# Patient Record
Sex: FEMALE | URBAN - METROPOLITAN AREA
[De-identification: names, ages, dates, MRNs, and addresses within clinical notes are randomized per-mention and may not be internally consistent; named-entity substitution may affect disease eponyms.]

---

## 2018-03-22 ENCOUNTER — OFFICE VISIT (OUTPATIENT)
Dept: HEMATOLOGY | Age: 68
End: 2018-03-22

## 2018-03-22 VITALS
TEMPERATURE: 97.9 F | DIASTOLIC BLOOD PRESSURE: 76 MMHG | OXYGEN SATURATION: 99 % | BODY MASS INDEX: 34.99 KG/M2 | WEIGHT: 178.2 LBS | HEIGHT: 60 IN | SYSTOLIC BLOOD PRESSURE: 142 MMHG | HEART RATE: 86 BPM

## 2018-03-22 DIAGNOSIS — B18.2 CHRONIC HEPATITIS C WITHOUT HEPATIC COMA (HCC): Primary | ICD-10-CM

## 2018-03-22 RX ORDER — CLOPIDOGREL BISULFATE 75 MG/1
TABLET ORAL
COMMUNITY
Start: 2018-02-05

## 2018-03-22 RX ORDER — METFORMIN HYDROCHLORIDE 500 MG/1
TABLET ORAL 2 TIMES DAILY WITH MEALS
COMMUNITY

## 2018-03-22 RX ORDER — ATORVASTATIN CALCIUM 40 MG/1
TABLET, FILM COATED ORAL
COMMUNITY
Start: 2018-02-05

## 2018-03-22 NOTE — MR AVS SNAPSHOT
[facility]00 [facility] [id number] [address] [place] [address] 
[phone] Patient: Jm Horvath MRN: [id number] Visit Information Date & Time Provider Department Dept. Phone Encounter #  
 3/22/2018  1:30 PM Fara PIZANO [facility] [place], [address] [facility] [id number] Upcoming Health Maintenance Date Due Hepatitis C Screening 1950 DTaP/Tdap/Td series (1 - Tdap) 6/23/1972 BREAST CANCER SCRN MAMMOGRAM 6/23/2001 FOBT Q 1 YEAR AGE 50-75 6/23/2001 ZOSTER VACCINE AGE 60> 4/23/2011 GLAUCOMA SCREENING Q2Y 6/23/2016 Bone Densitometry (Dexa) Screening 6/23/2016 Pneumococcal 65+ Low/Medium Risk (1 of 2 - PCV13) 6/23/2016 Influenza Age 5 to Adult 8/1/2017 MEDICARE YEARLY EXAM 3/14/2018 Allergies as of 3/22/2018  Review Complete On: 3/22/2018 By: Franca Cage LPN Not on File Current Immunizations  Never Reviewed No immunizations on file. Not reviewed this visit Vitals BP Pulse Temp Height(growth percentile) 142/76 (BP 1 Location: Left arm, BP Patient Position: Sitting) 86 97.9 °F (36.6 °C) (Tympanic) 5' (1.524 m) Weight(growth percentile) SpO2 BMI Smoking Status 178 lb 3.2 oz (80.8 kg) 99% 34.8 kg/m2 Never Smoker Vitals History BMI and BSA Data Body Mass Index Body Surface Area 34.8 kg/m 2 1.85 m 2 Your Updated Medication List  
  
   
This list is accurate as of 3/22/18  1:43 PM.  Always use your most recent med list.  
  
  
  
  
 metFORMIN 500 mg tablet Commonly known as:  GLUCOPHAGE Take  by mouth two (2) times daily (with meals). Introducing [facility]! [facility] introduces [employer] patient portal. Now you can access parts of your medical record, email your doctor's office, and request medication refills online. 1. In your internet browser, go to [email or web address] 2. Click on the First Time User? Click Here link in the Sign In box. You will see the New Member Sign Up page. 3. Enter your [employer] Access Code exactly as it appears below. You will not need to use this code after youve completed the sign-up process. If you do not sign up before the expiration date, you must request a new code. · [employer] Access Code: [email or web address] Expires: 6/20/2018 12:57 PM 
 
4. Enter the last four digits of your Social Security Number (xxxx) and Date of Birth (mm/dd/yyyy) as indicated and click Submit. You will be taken to the next sign-up page. 5. Create a [employer] ID. This will be your [employer] login ID and cannot be changed, so think of one that is secure and easy to remember. 6. Create a [employer] password. You can change your password at any time. 7. Enter your Password Reset Question and Answer. This can be used at a later time if you forget your password. 8. Enter your e-mail address. You will receive e-mail notification when new information is available in [address] 9. Click Sign Up. You can now view and download portions of your medical record. 10. Click the Download Summary menu link to download a portable copy of your medical information. If you have questions, please visit the Frequently Asked Questions section of the [employer] website. Remember, [employer] is NOT to be used for urgent needs. For medical emergencies, dial 911. Now available from your iPhone and Android! Please provide this summary of care documentation to your next provider. If you have any questions after today's visit, please call [phone].

## 2018-03-22 NOTE — PROGRESS NOTES
Chief Complaint   Patient presents with    New Patient     HCV  treatment     Visit Vitals    /76 (BP 1 Location: Left arm, BP Patient Position: Sitting)    Pulse 86    Temp 97.9 °F (36.6 °C) (Tympanic)    Ht 5' (1.524 m)    Wt 178 lb 3.2 oz (80.8 kg)    SpO2 99%    BMI 34.8 kg/m2     PHQ over the last two weeks 3/22/2018   Little interest or pleasure in doing things Not at all   Feeling down, depressed or hopeless Not at all   Total Score PHQ 2 0     Medication:  Patient states she is taking a blood thinner, but not sure which one or dose. She also takes a statin, but not sure of the name or dose.

## 2018-03-23 PROBLEM — E78.00 HIGH CHOLESTEROL: Status: ACTIVE | Noted: 2018-03-23

## 2018-03-23 PROBLEM — B18.2 CHRONIC HEPATITIS C WITHOUT HEPATIC COMA (HCC): Status: ACTIVE | Noted: 2018-03-23

## 2018-03-23 NOTE — PROGRESS NOTES
70 Nate Au MD, [address], [place], [place]       Clara Russell, MAYDA Payne, [facility]   Valorie Bassett, BRENNAN Rodriguez [facility] [address]    at [address], [address] Saeid Baron  22.    [phone]    FAX: [address] [place] [address], [address]    [phone]    FAX: [phone]     No care team member to display    Magui Del Toro has asked me to see Pam Lopez in consultation regarding chronic HCV and its management. We attempted a few times during the visit to obtain records and labs. No medical records were available for review when the patient was here for the appointment. The patient is a 78 y.o.  female who was noted to have abnormalities in liver chemistries and subsequently tested positive for chronic HCV recently. Risk factors for acquiring HCV are not apparent. Imaging of the liver was either not performed or the results are not available to me. An assessment of liver fibrosis with biopsy or elastography has not been performed. The patient has never received treatment for chronic HCV. The patient has no symptoms which can be attributed to the liver disorder. The patient has not experienced pain in the right side over the liver, yellowing of the eyes or skin or swelling of the lower extremities. The patient has mild limitations in functional activities which can be attributed to other medical problems that are not related to the liver disease. ALLERGIES  Not on File    MEDICATIONS  Current Outpatient Prescriptions   Medication Sig    metFORMIN (GLUCOPHAGE) 500 mg tablet Take  by mouth two (2) times daily (with meals).  atorvastatin (LIPITOR) 40 mg tablet     clopidogrel (PLAVIX) 75 mg tab      No current facility-administered medications for this visit. SYSTEM REVIEW NOT RELATED TO LIVER DISEASE OR REVIEWED ABOVE:  Constitution systems: Negative for fever, chills, weight gain, weight loss. Eyes: Negative for visual changes. ENT: Negative for sore throat, painful swallowing. Respiratory: Negative for cough, hemoptysis, SOB. Cardiology: Negative for chest pain, palpitations. GI:  Negative for constipation or diarrhea. : Negative for urinary frequency, dysuria, hematuria, nocturia. Skin: Negative for rash. Hematology: Negative for easy bruising, blood clots. Musculo-skeletal: Negative for back pain, muscle pain, weakness. Neurologic: Negative for headaches, dizziness, vertigo, memory problems not related to HE. Psychology: Negative for anxiety, depression. FAMILY HISTORY:  There is no family history of liver disease. SOCIAL HISTORY:  The patient is . The patient has 2 children and 6 grandchildren. She informs me one son recently  acutely when heart valves were found to be ruptured.  during surgery. The patient has never used tobacco products. The patient has never consumed significant amounts of alcohol. The patient has not worked outside of the home. PHYSICAL EXAMINATION:  Visit Vitals    /76 (BP 1 Location: Left arm, BP Patient Position: Sitting)    Pulse 86    Temp 97.9 °F (36.6 °C) (Tympanic)    Ht 5' (1.524 m)    Wt 178 lb 3.2 oz (80.8 kg)    SpO2 99%    BMI 34.8 kg/m2     General: No acute distress. Obese. Eyes: Sclera anicteric. ENT: No oral lesions. Nodes: No adenopathy. Skin: No spider angiomata. No jaundice. No palmar erythema. Respiratory: Lungs clear to auscultation. Cardiovascular: Regular heart rate. No murmurs. No JVD. Abdomen: Soft non-tender. Liver size normal to percussion/palpation. Spleen not palpable.  No obvious ascites. Extremities: No edema. No muscle wasting. No gross arthritic changes. Neurologic: Alert and oriented. Cranial nerves grossly intact. No asterixis. LABORATORY STUDIES:  Recent liver function panel, CBC with platelet count and BMP are not available. These studies will be performed. SEROLOGIES:  Not available or performed. Testing was performed today. LIVER HISTOLOGY:  Not available or performed    ENDOSCOPIC PROCEDURES:  Not available or performed    RADIOLOGY:  Not available or performed    OTHER TESTING:  Not available or performed    ASSESSMENT AND PLAN:  Chronic HCV of unclear severity. Will perform laboratory testing to monitor liver function and degree of liver injury. This will be ordered once we receive the previously drawn labs. Do not want to duplicate. Will perform and/or review results of HCV viral load and HCV genotype to define the specific treatment and duration of treatment that will be required. Will perform serologic and virologic studies to assess for other causes of chronic liver disease. Will perform imaging of the liver with ultrasound. The need to perform an assessment of liver fibrosis was discussed with the patient. The FibroScan can assess liver fibrosis and determine if a patient has advanced fibrosis or cirrhosis without the need for liver biopsy. The FibroScan is currently available at [facility]. This will be performed at the next office visit. The patient has not previously been treated for HCV. The patient was directed to continue all current medications at the current dosages. There are no contraindications for the patient to take any medications that are necessary for treatment of other medical issues. The need for vaccination against viral hepatitis A and B will be assessed with serologic and instituted as appropriate. [facility] [address]. screening will be initiated if the patient is found to be cirrhotic.      All of the above issues were discussed with the patient. All questions were answered. The patient expressed a clear understanding of the above. [facility] 87 in 4 weeks for FibroScan, to review all data and determine the treatment plan. Will determine why she is on Plavix and try to obtain more thorough records.      Rik Benedict, ACNP-BC  Liver [place] [facility] 40[address] [facility] [employer] [facility], [address] Saeid Baron  22.  [phone]

## 2018-03-27 ENCOUNTER — TELEPHONE (OUTPATIENT)
Dept: HEMATOLOGY | Age: 68
End: 2018-03-27

## 2018-03-27 NOTE — TELEPHONE ENCOUNTER
Called and spoke with the patient and she is taking the Plavix because she had a stroke 3-4 months ago.  The patient was also advised that lab orders will be mailed to her per Jaxson Gilmore NP

## 2018-03-28 DIAGNOSIS — B18.2 CHRONIC HEPATITIS C WITHOUT HEPATIC COMA (HCC): Primary | ICD-10-CM

## 2018-05-02 DIAGNOSIS — B18.2 CHRONIC HEPATITIS C WITHOUT HEPATIC COMA (HCC): Primary | ICD-10-CM

## 2018-05-02 RX ORDER — VELPATASVIR AND SOFOSBUVIR 100; 400 MG/1; MG/1
1 TABLET, FILM COATED ORAL DAILY
Qty: 28 TAB | Refills: 2 | Status: SHIPPED | OUTPATIENT
Start: 2018-05-02

## 2018-05-02 NOTE — PROGRESS NOTES
This patient is [facility] patient. They have drawn all of the labs but unfortunately are not in our system. I will document here and then scan into the chart. 4/17/2018  WBC: 9.1  HGB: 12.2  PLT: 164    INR: 1.1    CMP:  Na: 142  K: 4.0  CL: 101  CO2: 25  BUN: 21  Cr: 1.01  AST: 60  ALT: 71  ALP: 74  Alb: 4.2  T bili: 3.3    10/2/2017  HCV FibroSure: F1-F2  GT: 2b  Quant: 6,160,000    Hep A negative  Hep B negative      HOLD ATORVASTATIN while on Mavyret. She is GT 2. Rosario Victor NP  10:11 AM

## 2018-05-03 ENCOUNTER — TELEPHONE (OUTPATIENT)
Dept: HEMATOLOGY | Age: 68
End: 2018-05-03

## 2018-05-03 NOTE — TELEPHONE ENCOUNTER
Left voice mail for patient to return call concerning HCV medication. Follow up appointment for FS canceled per Raymundo Sarah NP due to previous Fibrosure blood draw. Patient is to follow up 4 weeks after receiving HCV medication that was ordered this week.

## 2018-05-30 ENCOUNTER — DOCUMENTATION ONLY (OUTPATIENT)
Dept: HEMATOLOGY | Age: 68
End: 2018-05-30

## 2018-05-30 NOTE — PROGRESS NOTES
Called pt back. She wanted to let us know she started taking the medication on 5/16/2018. She is having no side effects and has been approved for PANF. She understands this is to help offset her co-pay. I will have AW call her to set up 4 week follow up. Rosario Olivera NP  10:27 AM

## 2018-06-20 ENCOUNTER — OFFICE VISIT (OUTPATIENT)
Dept: HEMATOLOGY | Age: 68
End: 2018-06-20

## 2018-06-20 VITALS
SYSTOLIC BLOOD PRESSURE: 136 MMHG | HEART RATE: 79 BPM | OXYGEN SATURATION: 98 % | TEMPERATURE: 98.8 F | RESPIRATION RATE: 19 BRPM | WEIGHT: 179 LBS | BODY MASS INDEX: 35.14 KG/M2 | HEIGHT: 60 IN | DIASTOLIC BLOOD PRESSURE: 72 MMHG

## 2018-06-20 DIAGNOSIS — B18.2 CHRONIC HEPATITIS C WITHOUT HEPATIC COMA (HCC): Primary | ICD-10-CM

## 2018-06-20 PROBLEM — I63.9 CVA (CEREBRAL VASCULAR ACCIDENT) (HCC): Status: ACTIVE | Noted: 2018-06-20

## 2018-06-20 NOTE — PROGRESS NOTES
70 Nate Au MD, [address], [place], [place]       BRENNAN Ballard PA-C Johann Boos, ACNP-BC   Valerie Sanchez, BRENNAN Ortiz [facility] [address]    at [address], [address] Saeid Baron  22.    [phone]    FAX: [address] [place] [address]    at [facility], [id number] [place] [address], 6947 [facility],[address]    [address] [place] [address] Street: [phone]     No care team member to display    Problem List  Date Reviewed: 6/20/2018          Codes Class Noted    Chronic hepatitis C without hepatic coma [facility]) ICD-10-CM: B18.2  ICD-9-CM: 070.54  3/23/2018        High cholesterol ICD-10-CM: E78.00  ICD-9-CM: 272.0  3/23/2018            Curtis Linares returns to the [facility] of [facility] for management of chronic HCV. The active problem list, all pertinent past medical history, medications, liver histology, radiologic findings and laboratory findings related to the liver disorder were reviewed with the patient. The patient is a 78 y.o.  female who was noted to have abnormalities in liver chemistries and subsequently tested positive for chronic HCV recently. Risk factors for acquiring HCV are not apparent. Imaging of the liver was either not performed or the results are not available to me. An assessment of liver fibrosis with FibroSure demonstrates F1-F2 fibrosis. The patient is currently on 12 weeks of Epclusa. She is not having any issues or side effects. The patient has no symptoms which can be attributed to the liver disorder. The patient has not experienced pain in the right side over the liver, yellowing of the eyes or skin or swelling of the lower extremities.      The patient has mild limitations in functional activities which can be attributed to other medical problems that are not related to the liver disease. ALLERGIES  No Known Allergies    MEDICATIONS  Current Outpatient Prescriptions   Medication Sig    sofosbuvir-velpatasvir (EPCLUSA) 400-100 mg tab Take 1 Tab by mouth daily. Indications: CHRONIC HEPATITIS C - GENOTYPE 2    metFORMIN (GLUCOPHAGE) 500 mg tablet Take  by mouth two (2) times daily (with meals).  atorvastatin (LIPITOR) 40 mg tablet     clopidogrel (PLAVIX) 75 mg tab      No current facility-administered medications for this visit. SYSTEM REVIEW NOT RELATED TO LIVER DISEASE OR REVIEWED ABOVE:  Constitution systems: Negative for fever, chills, weight gain, weight loss. Eyes: Negative for visual changes. ENT: Negative for sore throat, painful swallowing. Respiratory: Negative for cough, hemoptysis, SOB. Cardiology: Negative for chest pain, palpitations. GI:  Negative for constipation or diarrhea. : Negative for urinary frequency, dysuria, hematuria, nocturia. Skin: Negative for rash. Hematology: Negative for easy bruising, blood clots. Musculo-skeletal: Negative for back pain, muscle pain, weakness. Neurologic: Negative for headaches, dizziness, vertigo, memory problems not related to HE. Psychology: Negative for anxiety, depression. FAMILY HISTORY:  There is no family history of liver disease. SOCIAL HISTORY:  The patient is . The patient has 2 children and 6 grandchildren. She informs me one son recently  acutely when heart valves were found to be ruptured.  during surgery. The patient has never used tobacco products. The patient has never consumed significant amounts of alcohol. The patient has not worked outside of the home.      PHYSICAL EXAMINATION:  Visit Vitals    /72 (BP 1 Location: Right arm, BP Patient Position: Sitting)    Pulse 79    Temp 98.8 °F (37.1 °C) (Tympanic)    Resp 19    Ht 5' (1.524 m)    [place] 179 lb (81.2 kg)    SpO2 98%    BMI 34.96 kg/m2     General: No acute distress. Obese. Eyes: Sclera anicteric. ENT: No oral lesions. Nodes: No adenopathy. Skin: No spider angiomata. No jaundice. No palmar erythema. Respiratory: Lungs clear to auscultation. Cardiovascular: Regular heart rate. No murmurs. No JVD. Abdomen: Soft non-tender. Liver size normal to percussion/palpation. Spleen not palpable. No obvious ascites. Extremities: No edema. No muscle wasting. No gross arthritic changes. Neurologic: Alert and oriented. Cranial nerves grossly intact. No asterixis. LABORATORY STUDIES:  From 4/17/2018  AST/ALT/ALP/T Bili/ALB: 60/71/74/0.3/4.2  WBC/HB/PLT/INR: 9.1/12.2/164  BUN/CREAT: 21/1.01    SEROLOGIES:  Hep A negative. Hep B sAb negative. Hep C: GT 2B, VL 6,160,000    LIVER HISTOLOGY:  10/2017. FibroSure. F1-F2    ENDOSCOPIC PROCEDURES:  Not available or performed    RADIOLOGY:  6/2017. Abdominal ultrasound. Hyperechoic spots in liver.  6/2017. CT abdomen and pelvis. No acute findings. OTHER TESTING:  Not available or performed    ASSESSMENT AND PLAN:  Chronic HCV with mild fibrosis. The patient is currently on 12 weeks of therapy with Epclusa. She is on her 2nd bottle and had no issues getting refills. She is tolerating the medication well and is having no side effects. The patient was directed to continue all current medications at the current dosages. There are no contraindications for the patient to take any medications that are necessary for treatment of other medical issues. I recommend she receive the Twinrix vaccine for both hep A and B. She has no documented immunity. All of the above issues were discussed with the patient. All questions were answered. The patient expressed a clear understanding of the above. Follow up at [facility] in 8 weeks after completion of medication.     Rich Saez, [facility]-BC  [address] [facility] [address], [address] [address]ae[address]  22.  [phone]

## 2018-06-20 NOTE — MR AVS SNAPSHOT
2700 [facility] Juan [id number] [address] [place] [address] 
[phone] Patient: Michelle Parson MRN: [id number] KCY:[id number]/86/[id number] Visit Information Date & Time Provider Department Dept. Phone Encounter #  
 6/20/2018  1:45 PM Mary Martin, 3687 [facility] [id number] Your Appointments 6/20/2018  1:45 PM  
Follow Up with Mary Martin, NP [address] ([facility]) Appt Note: Follow up 200 [facility] [id number] [facility] [address]  
59 [facility] Juan [address] Upcoming Health Maintenance Date Due DTaP/Tdap/Td series (1 - Tdap) 6/23/1972 BREAST CANCER SCRN MAMMOGRAM 6/23/2001 FOBT Q 1 YEAR AGE 50-75 6/23/2001 ZOSTER VACCINE AGE 60> 4/23/2011 GLAUCOMA SCREENING Q2Y 6/23/2016 Bone Densitometry (Dexa) Screening 6/23/2016 Pneumococcal 65+ Low/Medium Risk (1 of 2 - PCV13) 6/23/2016 MEDICARE YEARLY EXAM 3/14/2018 Influenza Age 5 to Adult 8/1/2018 Allergies as of 6/20/2018  Review Complete On: 6/20/2018 By: Kristian Velasco LPN No Known Allergies Current Immunizations  Never Reviewed No immunizations on file. Not reviewed this visit You Were Diagnosed With   
  
 Codes Comments Chronic hepatitis C without hepatic coma (HCC)    -  Primary ICD-10-CM: B18.2 ICD-9-CM: 070.54 Vitals BP Pulse Temp Resp Height(growth percentile) 136/72 (BP 1 Location: Right arm, BP Patient Position: Sitting) 79 98.8 °F (37.1 °C) (Tympanic) 19 5' (1.524 m) Weight(growth percentile) SpO2 BMI OB Status Smoking Status 179 lb (81.2 kg) 98% 34.96 kg/m2 Menopause Never Smoker Vitals History BMI and BSA Data Body Mass Index Body Surface Area 34.96 kg/m 2 1.85 m 2 Preferred Pharmacy Pharmacy Name Phone [facility], [address] [place] Said [phone] Your Updated Medication List  
  
   
This list is accurate as of 6/20/18  1:40 PM.  Always use your most recent med list.  
  
  
  
  
 atorvastatin 40 mg tablet Commonly known as:  LIPITOR  
  
 clopidogrel 75 mg Tab Commonly known as:  PLAVIX  
  
 metFORMIN 500 mg tablet Commonly known as:  GLUCOPHAGE Take  by mouth two (2) times daily (with meals). sofosbuvir-velpatasvir 400-100 mg Tab Commonly known as:  [place] Daub Take 1 Tab by mouth daily. Indications: CHRONIC HEPATITIS C - GENOTYPE 2 We Performed the Following CBC W/O DIFF [17772 CPT(R)] HCV RNA BY JESSY QL,RFLX TO QT [63220 CPT(R)] HEPATIC FUNCTION PANEL [28388 CPT(R)] METABOLIC PANEL, BASIC [28671 CPT(R)] Introducing [facility]! Abhinav Tai introduces [employer] patient portal. Now you can access parts of your medical record, email your doctor's office, and request medication refills online. 1. In your internet browser, go to [email or web address] [employer] 2. Click on the First Time User? Click Here link in the Sign In box. You will see the New Member Sign Up page. 3. Enter your [employer] Access Code exactly as it appears below. You will not need to use this code after youve completed the sign-up process. If you do not sign up before the expiration date, you must request a new code. · [employer] Access Code: [email or web address] Expires: 9/18/2018  1:40 PM 
 
4. Enter the last four digits of your Social Security Number (xxxx) and Date of Birth (mm/dd/yyyy) as indicated and click Submit. You will be taken to the next sign-up page. 5. Create a [employer] ID. This will be your [employer] login ID and cannot be changed, so think of one that is secure and easy to remember. 6. Create a [employer] password. You can change your password at any time. 7. Enter your Password Reset Question and Answer. This can be used at a later time if you forget your password. 8. Enter your e-mail address. You will receive e-mail notification when new information is available in [address] 9. Click Sign Up. You can now view and download portions of your medical record. 10. Click the Download Summary menu link to download a portable copy of your medical information. If you have questions, please visit the Frequently Asked Questions section of the [employer] website. Remember, [employer] is NOT to be used for urgent needs. For medical emergencies, dial 911. Now available from your iPhone and Android! Please provide this summary of care documentation to your next provider. If you have any questions after today's visit, please call [phone].

## 2018-06-21 LAB
ALBUMIN SERPL-MCNC: 4.2 G/DL (ref 3.6–4.8)
ALP SERPL-CCNC: 68 IU/L (ref 39–117)
ALT SERPL-CCNC: 22 IU/L (ref 0–32)
AST SERPL-CCNC: 25 IU/L (ref 0–40)
BILIRUB DIRECT SERPL-MCNC: 0.07 MG/DL (ref 0–0.4)
BILIRUB SERPL-MCNC: <0.2 MG/DL (ref 0–1.2)
BUN SERPL-MCNC: 15 MG/DL (ref 8–27)
BUN/CREAT SERPL: 15 (ref 12–28)
CALCIUM SERPL-MCNC: 9.4 MG/DL (ref 8.7–10.3)
CHLORIDE SERPL-SCNC: 102 MMOL/L (ref 96–106)
CO2 SERPL-SCNC: 21 MMOL/L (ref 20–29)
CREAT SERPL-MCNC: 1.01 MG/DL (ref 0.57–1)
ERYTHROCYTE [DISTWIDTH] IN BLOOD BY AUTOMATED COUNT: 14.3 % (ref 12.3–15.4)
GFR SERPLBLD CREATININE-BSD FMLA CKD-EPI: 58 ML/MIN/1.73
GFR SERPLBLD CREATININE-BSD FMLA CKD-EPI: 67 ML/MIN/1.73
GLUCOSE SERPL-MCNC: 87 MG/DL (ref 65–99)
HCT VFR BLD AUTO: 36.4 % (ref 34–46.6)
HGB BLD-MCNC: 12 G/DL (ref 11.1–15.9)
MCH RBC QN AUTO: 28 PG (ref 26.6–33)
MCHC RBC AUTO-ENTMCNC: 33 G/DL (ref 31.5–35.7)
MCV RBC AUTO: 85 FL (ref 79–97)
PLATELET # BLD AUTO: 188 X10E3/UL (ref 150–379)
POTASSIUM SERPL-SCNC: 4.5 MMOL/L (ref 3.5–5.2)
PROT SERPL-MCNC: 7.8 G/DL (ref 6–8.5)
RBC # BLD AUTO: 4.28 X10E6/UL (ref 3.77–5.28)
SODIUM SERPL-SCNC: 140 MMOL/L (ref 134–144)
WBC # BLD AUTO: 12.2 X10E3/UL (ref 3.4–10.8)

## 2018-06-22 LAB — HCV RNA SERPL QL NAA+PROBE: NEGATIVE

## 2018-06-29 NOTE — PROGRESS NOTES
Tried to call the patient twice. An unintelligible broken up message is heard, then the call is disconnected.

## 2022-03-19 PROBLEM — I63.9 CVA (CEREBRAL VASCULAR ACCIDENT) (HCC): Status: ACTIVE | Noted: 2018-06-20

## 2022-03-19 PROBLEM — B18.2 CHRONIC HEPATITIS C WITHOUT HEPATIC COMA (HCC): Status: ACTIVE | Noted: 2018-03-23

## 2022-03-19 PROBLEM — E78.00 HIGH CHOLESTEROL: Status: ACTIVE | Noted: 2018-03-23

## 2023-03-06 ENCOUNTER — TRANSCRIBE ORDER (OUTPATIENT)
Dept: SCHEDULING | Age: 73
End: 2023-03-06

## 2023-03-06 DIAGNOSIS — Z78.0 POSTMENOPAUSAL: ICD-10-CM

## 2023-03-06 DIAGNOSIS — Z12.31 VISIT FOR SCREENING MAMMOGRAM: Primary | ICD-10-CM

## 2023-09-15 ENCOUNTER — TRANSCRIBE ORDERS (OUTPATIENT)
Dept: SCHEDULING | Age: 73
End: 2023-09-15

## 2023-09-15 DIAGNOSIS — Z12.31 ENCOUNTER FOR SCREENING MAMMOGRAM FOR MALIGNANT NEOPLASM OF BREAST: Primary | ICD-10-CM

## 2024-02-04 ENCOUNTER — APPOINTMENT (OUTPATIENT)
Facility: HOSPITAL | Age: 74
DRG: 871 | End: 2024-02-04
Payer: MEDICARE

## 2024-02-04 ENCOUNTER — HOSPITAL ENCOUNTER (INPATIENT)
Facility: HOSPITAL | Age: 74
LOS: 5 days | Discharge: NURSING, REHAB OR LONG-TERM CARE | DRG: 871 | End: 2024-02-09
Attending: EMERGENCY MEDICINE | Admitting: INTERNAL MEDICINE
Payer: MEDICARE

## 2024-02-04 DIAGNOSIS — R41.0 DISORIENTATION: ICD-10-CM

## 2024-02-04 DIAGNOSIS — R41.0 CONFUSION: ICD-10-CM

## 2024-02-04 DIAGNOSIS — R00.0 TACHYCARDIA: Primary | ICD-10-CM

## 2024-02-04 PROBLEM — R41.82 ALTERED MENTAL STATUS: Status: ACTIVE | Noted: 2024-02-04

## 2024-02-04 LAB
ALBUMIN SERPL-MCNC: 3.4 G/DL (ref 3.5–5)
ALBUMIN/GLOB SERPL: 0.8 (ref 1.1–2.2)
ALP SERPL-CCNC: 68 U/L (ref 45–117)
ALT SERPL-CCNC: 19 U/L (ref 12–78)
AMPHET UR QL SCN: NEGATIVE
ANION GAP SERPL CALC-SCNC: 6 MMOL/L (ref 5–15)
APPEARANCE UR: CLEAR
AST SERPL-CCNC: 13 U/L (ref 15–37)
BACTERIA URNS QL MICRO: NEGATIVE /HPF
BARBITURATES UR QL SCN: NEGATIVE
BASOPHILS # BLD: 0.1 K/UL (ref 0–0.1)
BASOPHILS NFR BLD: 1 % (ref 0–1)
BENZODIAZ UR QL: NEGATIVE
BILIRUB SERPL-MCNC: 0.1 MG/DL (ref 0.2–1)
BILIRUB UR QL: NEGATIVE
BUN SERPL-MCNC: 23 MG/DL (ref 6–20)
BUN/CREAT SERPL: 19 (ref 12–20)
CALCIUM SERPL-MCNC: 8.5 MG/DL (ref 8.5–10.1)
CANNABINOIDS UR QL SCN: NEGATIVE
CHLORIDE SERPL-SCNC: 111 MMOL/L (ref 97–108)
CO2 SERPL-SCNC: 26 MMOL/L (ref 21–32)
COCAINE UR QL SCN: NEGATIVE
COLOR UR: NORMAL
CREAT SERPL-MCNC: 1.24 MG/DL (ref 0.55–1.02)
DIFFERENTIAL METHOD BLD: ABNORMAL
EOSINOPHIL # BLD: 0.1 K/UL (ref 0–0.4)
EOSINOPHIL NFR BLD: 1 % (ref 0–7)
EPITH CASTS URNS QL MICRO: NORMAL /LPF
ERYTHROCYTE [DISTWIDTH] IN BLOOD BY AUTOMATED COUNT: 13.4 % (ref 11.5–14.5)
ETHANOL SERPL-MCNC: <10 MG/DL (ref 0–0.08)
FLUAV AG NPH QL IA: NEGATIVE
FLUBV AG NOSE QL IA: NEGATIVE
GLOBULIN SER CALC-MCNC: 4.3 G/DL (ref 2–4)
GLUCOSE SERPL-MCNC: 104 MG/DL (ref 65–100)
GLUCOSE UR STRIP.AUTO-MCNC: NEGATIVE MG/DL
HCT VFR BLD AUTO: 34.7 % (ref 35–47)
HGB BLD-MCNC: 11.2 G/DL (ref 11.5–16)
HGB UR QL STRIP: NEGATIVE
HYALINE CASTS URNS QL MICRO: NORMAL /LPF (ref 0–2)
IMM GRANULOCYTES # BLD AUTO: 0 K/UL (ref 0–0.04)
IMM GRANULOCYTES NFR BLD AUTO: 0 % (ref 0–0.5)
KETONES UR QL STRIP.AUTO: NEGATIVE MG/DL
LACTATE SERPL-SCNC: 1.2 MMOL/L (ref 0.4–2)
LEUKOCYTE ESTERASE UR QL STRIP.AUTO: NEGATIVE
LYMPHOCYTES # BLD: 2.6 K/UL (ref 0.8–3.5)
LYMPHOCYTES NFR BLD: 23 % (ref 12–49)
Lab: NORMAL
MAGNESIUM SERPL-MCNC: 1.8 MG/DL (ref 1.6–2.4)
MCH RBC QN AUTO: 26.6 PG (ref 26–34)
MCHC RBC AUTO-ENTMCNC: 32.3 G/DL (ref 30–36.5)
MCV RBC AUTO: 82.4 FL (ref 80–99)
METHADONE UR QL: NEGATIVE
MONOCYTES # BLD: 0.7 K/UL (ref 0–1)
MONOCYTES NFR BLD: 7 % (ref 5–13)
NEUTS SEG # BLD: 7.5 K/UL (ref 1.8–8)
NEUTS SEG NFR BLD: 68 % (ref 32–75)
NITRITE UR QL STRIP.AUTO: NEGATIVE
NRBC # BLD: 0 K/UL (ref 0–0.01)
NRBC BLD-RTO: 0 PER 100 WBC
OPIATES UR QL: NEGATIVE
PCP UR QL: NEGATIVE
PH UR STRIP: 6 (ref 5–8)
PLATELET # BLD AUTO: 219 K/UL (ref 150–400)
PMV BLD AUTO: 11.6 FL (ref 8.9–12.9)
POTASSIUM SERPL-SCNC: 3.4 MMOL/L (ref 3.5–5.1)
PROT SERPL-MCNC: 7.7 G/DL (ref 6.4–8.2)
PROT UR STRIP-MCNC: NEGATIVE MG/DL
RBC # BLD AUTO: 4.21 M/UL (ref 3.8–5.2)
RBC #/AREA URNS HPF: NORMAL /HPF (ref 0–5)
SARS-COV-2 RDRP RESP QL NAA+PROBE: NOT DETECTED
SODIUM SERPL-SCNC: 143 MMOL/L (ref 136–145)
SOURCE: NORMAL
SP GR UR REFRACTOMETRY: 1.02
TROPONIN I SERPL HS-MCNC: 18 NG/L (ref 0–51)
TSH SERPL DL<=0.05 MIU/L-ACNC: 0.38 UIU/ML (ref 0.36–3.74)
URINE CULTURE IF INDICATED: NORMAL
UROBILINOGEN UR QL STRIP.AUTO: 0.2 EU/DL (ref 0.2–1)
WBC # BLD AUTO: 10.9 K/UL (ref 3.6–11)
WBC URNS QL MICRO: NORMAL /HPF (ref 0–4)

## 2024-02-04 PROCEDURE — 83735 ASSAY OF MAGNESIUM: CPT

## 2024-02-04 PROCEDURE — 87804 INFLUENZA ASSAY W/OPTIC: CPT

## 2024-02-04 PROCEDURE — 6360000002 HC RX W HCPCS: Performed by: INTERNAL MEDICINE

## 2024-02-04 PROCEDURE — 70450 CT HEAD/BRAIN W/O DYE: CPT

## 2024-02-04 PROCEDURE — 96372 THER/PROPH/DIAG INJ SC/IM: CPT

## 2024-02-04 PROCEDURE — 99285 EMERGENCY DEPT VISIT HI MDM: CPT

## 2024-02-04 PROCEDURE — 81001 URINALYSIS AUTO W/SCOPE: CPT

## 2024-02-04 PROCEDURE — 71045 X-RAY EXAM CHEST 1 VIEW: CPT

## 2024-02-04 PROCEDURE — 80053 COMPREHEN METABOLIC PANEL: CPT

## 2024-02-04 PROCEDURE — 87635 SARS-COV-2 COVID-19 AMP PRB: CPT

## 2024-02-04 PROCEDURE — 85025 COMPLETE CBC W/AUTO DIFF WBC: CPT

## 2024-02-04 PROCEDURE — 82077 ASSAY SPEC XCP UR&BREATH IA: CPT

## 2024-02-04 PROCEDURE — 2580000003 HC RX 258: Performed by: INTERNAL MEDICINE

## 2024-02-04 PROCEDURE — 80307 DRUG TEST PRSMV CHEM ANLYZR: CPT

## 2024-02-04 PROCEDURE — 1100000000 HC RM PRIVATE

## 2024-02-04 PROCEDURE — 2580000003 HC RX 258: Performed by: EMERGENCY MEDICINE

## 2024-02-04 PROCEDURE — 36415 COLL VENOUS BLD VENIPUNCTURE: CPT

## 2024-02-04 PROCEDURE — 84484 ASSAY OF TROPONIN QUANT: CPT

## 2024-02-04 PROCEDURE — 6370000000 HC RX 637 (ALT 250 FOR IP): Performed by: EMERGENCY MEDICINE

## 2024-02-04 PROCEDURE — 83605 ASSAY OF LACTIC ACID: CPT

## 2024-02-04 PROCEDURE — 84443 ASSAY THYROID STIM HORMONE: CPT

## 2024-02-04 PROCEDURE — 6360000002 HC RX W HCPCS: Performed by: EMERGENCY MEDICINE

## 2024-02-04 PROCEDURE — 87040 BLOOD CULTURE FOR BACTERIA: CPT

## 2024-02-04 PROCEDURE — 6370000000 HC RX 637 (ALT 250 FOR IP): Performed by: INTERNAL MEDICINE

## 2024-02-04 RX ORDER — 0.9 % SODIUM CHLORIDE 0.9 %
1000 INTRAVENOUS SOLUTION INTRAVENOUS
Status: COMPLETED | OUTPATIENT
Start: 2024-02-04 | End: 2024-02-04

## 2024-02-04 RX ORDER — LABETALOL HYDROCHLORIDE 5 MG/ML
10 INJECTION INTRAVENOUS EVERY 4 HOURS PRN
Status: DISCONTINUED | OUTPATIENT
Start: 2024-02-04 | End: 2024-02-09 | Stop reason: HOSPADM

## 2024-02-04 RX ORDER — LANOLIN ALCOHOL/MO/W.PET/CERES
3 CREAM (GRAM) TOPICAL NIGHTLY
Status: DISCONTINUED | OUTPATIENT
Start: 2024-02-04 | End: 2024-02-09 | Stop reason: HOSPADM

## 2024-02-04 RX ORDER — ONDANSETRON 2 MG/ML
4 INJECTION INTRAMUSCULAR; INTRAVENOUS EVERY 6 HOURS PRN
Status: DISCONTINUED | OUTPATIENT
Start: 2024-02-04 | End: 2024-02-09 | Stop reason: HOSPADM

## 2024-02-04 RX ORDER — MAGNESIUM HYDROXIDE/ALUMINUM HYDROXICE/SIMETHICONE 120; 1200; 1200 MG/30ML; MG/30ML; MG/30ML
30 SUSPENSION ORAL EVERY 6 HOURS PRN
Status: DISCONTINUED | OUTPATIENT
Start: 2024-02-04 | End: 2024-02-09 | Stop reason: HOSPADM

## 2024-02-04 RX ORDER — SODIUM CHLORIDE 9 MG/ML
INJECTION, SOLUTION INTRAVENOUS CONTINUOUS
Status: ACTIVE | OUTPATIENT
Start: 2024-02-04 | End: 2024-02-06

## 2024-02-04 RX ORDER — DIAZEPAM 5 MG/ML
5 INJECTION, SOLUTION INTRAMUSCULAR; INTRAVENOUS EVERY 4 HOURS PRN
Status: DISCONTINUED | OUTPATIENT
Start: 2024-02-04 | End: 2024-02-05

## 2024-02-04 RX ORDER — ACETAMINOPHEN 325 MG/1
650 TABLET ORAL EVERY 6 HOURS PRN
Status: DISCONTINUED | OUTPATIENT
Start: 2024-02-04 | End: 2024-02-09 | Stop reason: HOSPADM

## 2024-02-04 RX ORDER — SODIUM CHLORIDE 0.9 % (FLUSH) 0.9 %
5-40 SYRINGE (ML) INJECTION PRN
Status: DISCONTINUED | OUTPATIENT
Start: 2024-02-04 | End: 2024-02-09 | Stop reason: HOSPADM

## 2024-02-04 RX ORDER — ACETAMINOPHEN 650 MG/1
650 SUPPOSITORY RECTAL EVERY 6 HOURS PRN
Status: DISCONTINUED | OUTPATIENT
Start: 2024-02-04 | End: 2024-02-09 | Stop reason: HOSPADM

## 2024-02-04 RX ORDER — POLYETHYLENE GLYCOL 3350 17 G/17G
17 POWDER, FOR SOLUTION ORAL DAILY PRN
Status: DISCONTINUED | OUTPATIENT
Start: 2024-02-04 | End: 2024-02-09 | Stop reason: HOSPADM

## 2024-02-04 RX ORDER — ONDANSETRON 4 MG/1
4 TABLET, ORALLY DISINTEGRATING ORAL EVERY 8 HOURS PRN
Status: DISCONTINUED | OUTPATIENT
Start: 2024-02-04 | End: 2024-02-09 | Stop reason: HOSPADM

## 2024-02-04 RX ORDER — OLANZAPINE 5 MG/1
10 TABLET, ORALLY DISINTEGRATING ORAL
Status: COMPLETED | OUTPATIENT
Start: 2024-02-04 | End: 2024-02-04

## 2024-02-04 RX ORDER — DIAZEPAM 5 MG/ML
10 INJECTION, SOLUTION INTRAMUSCULAR; INTRAVENOUS ONCE
Status: COMPLETED | OUTPATIENT
Start: 2024-02-04 | End: 2024-02-04

## 2024-02-04 RX ORDER — ENOXAPARIN SODIUM 100 MG/ML
40 INJECTION SUBCUTANEOUS DAILY
Status: DISCONTINUED | OUTPATIENT
Start: 2024-02-04 | End: 2024-02-07

## 2024-02-04 RX ORDER — SODIUM CHLORIDE 9 MG/ML
INJECTION, SOLUTION INTRAVENOUS PRN
Status: DISCONTINUED | OUTPATIENT
Start: 2024-02-04 | End: 2024-02-09 | Stop reason: HOSPADM

## 2024-02-04 RX ORDER — SODIUM CHLORIDE 0.9 % (FLUSH) 0.9 %
5-40 SYRINGE (ML) INJECTION EVERY 12 HOURS SCHEDULED
Status: DISCONTINUED | OUTPATIENT
Start: 2024-02-04 | End: 2024-02-09 | Stop reason: HOSPADM

## 2024-02-04 RX ORDER — POTASSIUM CHLORIDE 7.45 MG/ML
10 INJECTION INTRAVENOUS
Status: DISPENSED | OUTPATIENT
Start: 2024-02-04 | End: 2024-02-04

## 2024-02-04 RX ORDER — TRAZODONE HYDROCHLORIDE 100 MG/1
100 TABLET ORAL ONCE
Status: DISCONTINUED | OUTPATIENT
Start: 2024-02-04 | End: 2024-02-09 | Stop reason: HOSPADM

## 2024-02-04 RX ADMIN — OLANZAPINE 10 MG: 5 TABLET, ORALLY DISINTEGRATING ORAL at 11:47

## 2024-02-04 RX ADMIN — ACETAMINOPHEN 650 MG: 650 SUPPOSITORY RECTAL at 18:16

## 2024-02-04 RX ADMIN — PIPERACILLIN AND TAZOBACTAM 4500 MG: 4; .5 INJECTION, POWDER, FOR SOLUTION INTRAVENOUS at 17:39

## 2024-02-04 RX ADMIN — ACYCLOVIR SODIUM 290 MG: 50 INJECTION, SOLUTION INTRAVENOUS at 18:26

## 2024-02-04 RX ADMIN — VANCOMYCIN HYDROCHLORIDE 1750 MG: 10 INJECTION, POWDER, LYOPHILIZED, FOR SOLUTION INTRAVENOUS at 20:13

## 2024-02-04 RX ADMIN — POTASSIUM CHLORIDE 10 MEQ: 10 INJECTION, SOLUTION INTRAVENOUS at 19:21

## 2024-02-04 RX ADMIN — SODIUM CHLORIDE: 9 INJECTION, SOLUTION INTRAVENOUS at 17:18

## 2024-02-04 RX ADMIN — SODIUM CHLORIDE, PRESERVATIVE FREE 10 ML: 5 INJECTION INTRAVENOUS at 17:15

## 2024-02-04 RX ADMIN — SODIUM CHLORIDE, PRESERVATIVE FREE 10 ML: 5 INJECTION INTRAVENOUS at 20:11

## 2024-02-04 RX ADMIN — POTASSIUM CHLORIDE 10 MEQ: 10 INJECTION, SOLUTION INTRAVENOUS at 17:20

## 2024-02-04 RX ADMIN — DIAZEPAM 10 MG: 5 INJECTION, SOLUTION INTRAMUSCULAR; INTRAVENOUS at 10:35

## 2024-02-04 RX ADMIN — SODIUM CHLORIDE 1000 ML: 9 INJECTION, SOLUTION INTRAVENOUS at 14:17

## 2024-02-04 ASSESSMENT — LIFESTYLE VARIABLES
HOW MANY STANDARD DRINKS CONTAINING ALCOHOL DO YOU HAVE ON A TYPICAL DAY: PATIENT DOES NOT DRINK
HOW OFTEN DO YOU HAVE A DRINK CONTAINING ALCOHOL: MONTHLY OR LESS

## 2024-02-04 NOTE — ED PROVIDER NOTES
EMERGENCY DEPARTMENT HISTORY AND PHYSICAL EXAM    Date: 2/4/2024  Patient Name: Baylee Martin  Patient Age and Sex: 73 y.o. female  MRN:  [id number]  CSN:  [id number]    History of Present Illness     Chief Complaint   Patient presents with    Altered Mental Status     Daughter called EMS for acute confusion.  Mother screaming and very agitated on EMS arrival.  Had to be given 5 mg IM Haldol by EMS prior to arrival.       History Provided By: EMS    Ability to gather history was limited by: Altered mental status, confusion, delirium    HPI: Baylee Martin, 73 y.o. female   With no known past medical history, no prior significant medical records here, and unable to provide medical history secondary to delirium and agitation, brought to the emergency department by EMS.  Upon arrival patient is trying to climb out of bed, grabbing at staff members, obviously very confused.  She is unable to provide any meaningful history about why she is here.  EMS reports that her daughter called 911 and was concerned the patient may have taken the wrong medications or too much medication, but no further details are available.      Tobacco Use      Smoking status: Not on file      Smokeless tobacco: Not on file     Past History   The patient's medical, surgical, and social history were reviewed by me today.    Current Medications:  No current facility-administered medications on file prior to encounter.     No current outpatient medications on file prior to encounter.       No past medical history on file.    No past surgical history on file.         Allergies:  No Known Allergies  Review of Systems   A Review of Systems was reviewed by me today during this encounter.  Pertinent positive and negative elements are noted in the HPI and MDM sections.    Review of Systems   Unable to perform ROS: Mental status change   Psychiatric/Behavioral:  Positive for agitation, behavioral problems and confusion.        Physical Exam   Vital  Signs  Patient Vitals for the past 8 hrs:   Temp Pulse Resp BP SpO2   02/04/24 1421 -- (!) 115 18 (!) 177/92 --   02/04/24 1320 -- -- -- -- 95 %   02/04/24 1319 -- -- -- -- 92 %   02/04/24 1315 -- (!) 118 20 (!) 160/109 93 %   02/04/24 1224 -- (!) 115 20 (!) 160/98 92 %   02/04/24 1135 98.2 °F (36.8 °C) 100 18 (!) 150/98 93 %   02/04/24 1048 -- -- -- -- 92 %   02/04/24 1043 97.8 °F (36.6 °C) (!) 102 16 (!) 146/100 92 %          Physical Exam  Vitals reviewed.   Constitutional:       General: She is not in acute distress.     Appearance: Normal appearance. She is not ill-appearing.   Cardiovascular:      Rate and Rhythm: Normal rate and regular rhythm.   Pulmonary:      Effort: Pulmonary effort is normal. No respiratory distress.      Breath sounds: No wheezing or rhonchi.   Abdominal:      General: Abdomen is flat. There is no distension.      Palpations: Abdomen is soft.      Tenderness: There is no abdominal tenderness.   Skin:     General: Skin is warm and dry.   Neurological:      General: No focal deficit present.      Mental Status: She is alert. She is disoriented and confused.   Psychiatric:         Behavior: Behavior is agitated and hyperactive.         Cognition and Memory: Cognition is impaired.      Comments: Very hyperactive, constantly trying to climb out of bed, grabbing at staff members, somewhat agitated but not overtly aggressive.  Pressured speech.  Obviously very confused.         Diagnostic Study Results   Labs  Recent Results (from the past 24 hour(s))   CMP    Collection Time: 02/04/24 11:08 AM   Result Value Ref Range    Sodium 143 136 - 145 mmol/L    Potassium 3.4 (L) 3.5 - 5.1 mmol/L    Chloride 111 (H) 97 - 108 mmol/L    CO2 26 21 - 32 mmol/L    Anion Gap 6 5 - 15 mmol/L    Glucose 104 (H) 65 - 100 mg/dL    BUN 23 (H) 6 - 20 MG/DL    Creatinine 1.24 (H) 0.55 - 1.02 MG/DL    Bun/Cre Ratio 19 12 - 20      Est, Glom Filt Rate 46 (L) >60 ml/min/1.73m2    Calcium 8.5 8.5 - 10.1 MG/DL    Total

## 2024-02-04 NOTE — PROGRESS NOTES
-Please complete MRI History and Safety Screening Form.    - Patient cannot be scanned until this form is completed and reviewed in MRI to ensure patient is SAFE and eligible for MRI.  - CALL MRI when this has been successfully completed at [phone].

## 2024-02-04 NOTE — H&P
Hospitalist Admission Note    NAME:   Baylee Martin   : 1950   MRN: [id number]     Date/Time: 2024 2:46 PM    Patient PCP: No primary care provider on file.    ______________________________________________________________________  Given the patient's current clinical presentation, I have a high level of concern for decompensation if discharged from the emergency department.  Complex decision making was performed, which includes reviewing the patient's available past medical records, laboratory results, and x-ray films.       My assessment of this patient's clinical condition and my plan of care is as follows.    Assessment / Plan:  Altered mental status, rule out meningitis  Question of undiagnosed mild dementia  History of stroke on aspirin and Xarelto  CT head is negative for any acute process.  Serum alcohol negative.  Urine toxicology within normal limits.  The patient developed fever in the ED course.  Continue to monitor patient in telemetry with neurochecks every 4 hourly.  Gentle hydration with normal saline at 125 cc/h.  Will get 2 sets of blood cultures, lactic acid.  Stat flu, COVID test.  Requested ED physician for LP before initiation of antimicrobials.  Start broadly on IV Zosyn, IV vancomycin, IV acyclovir.  MRI brain  Neurology consult.  As needed IV diazepam for agitation.  If patient continues to have behavioral issues, we may need to get a psychiatry consult.  Addendum at 8:50 pm: It is unclear if patient took Xarelto this am. Not safe for LP at this point. Will need to wait till tomorrow and consult Neuro/IR for LP. Started on antimicrobials.     Tachycardia  The patient is tachycardic with heart rate in the 110s.  Continue IV hydration with normal saline at 100 cc/h.  Will do stat EKG, stat troponin.  Will get echocardiogram to assess heart function.    Hypokalemia  Potassium of 3.4 and will give 40 KCl IV.  Will check for magnesium level and replete as necessary.        Medical  abnormalities. The visualized portions of the paranasal sinuses and mastoid air cells are clear.     No acute abnormality.      _______________________________________________________________________    TOTAL TIME:  76 Minutes    Critical Care Provided     Minutes non procedure based    Signed: Yvette Toney MD    Procedures: see electronic medical records for all procedures/Xrays and details which were not copied into this note but were reviewed prior to creation of Plan.

## 2024-02-04 NOTE — PROGRESS NOTES
1035  Medicated with IM Valium for severe agitation.  Will not keep her legs in the stretcher.  Tries to scoot to the end of the stretcher.  Unable to re-orient.  Alert but oriented to self only.  Respirations not labored.  Equal strength noted in all extremities.  Grabbing this RN's scrub shirt and chest.  Attempting to grab and squeeze EMS and this RN's hands despite verbal redirection.    1147  Patient remains agitated.  Attempting to sit up.  Unable to lay still.  No longer grabbing at staff.  Medicated with Zyprexa as ordered.    1200  Patient attempting to pull out her IV.  Unable to re-orient patient.  Patient placed in bilateral wrist restraints at this time.  Daughter educated about reason for reason for restraints.     1240  Patient to CT head at this time after able to stay still for 5 minutes for CT.      1725  Dr. Toney states okay to give antibiotics at this time.  No LP at this time due to being on Xarelto with risk of bleeding.  Family updated.      1838  Hayder CANO notified patient is receiving 2nd of 4 doses of Potassium when up to inpatient unit.  Also notified needs Vancomycin when Acyclovir finishes infusing.  SBAR out report given to Hayder CANO at this time.

## 2024-02-04 NOTE — ED TRIAGE NOTES
1030  Patient presents with acute confusion and agitation.  Unable to obtain history.  Unable to reorient patient.  Equal strength noted all extremities.

## 2024-02-05 ENCOUNTER — APPOINTMENT (OUTPATIENT)
Facility: HOSPITAL | Age: 74
DRG: 871 | End: 2024-02-05
Payer: MEDICARE

## 2024-02-05 LAB
ALBUMIN SERPL-MCNC: 2.8 G/DL (ref 3.5–5)
ALBUMIN/GLOB SERPL: 0.7 (ref 1.1–2.2)
ALP SERPL-CCNC: 56 U/L (ref 45–117)
ALT SERPL-CCNC: 15 U/L (ref 12–78)
ANION GAP SERPL CALC-SCNC: 3 MMOL/L (ref 5–15)
AST SERPL-CCNC: 19 U/L (ref 15–37)
BILIRUB SERPL-MCNC: 0.4 MG/DL (ref 0.2–1)
BUN SERPL-MCNC: 14 MG/DL (ref 6–20)
BUN/CREAT SERPL: 12 (ref 12–20)
CALCIUM SERPL-MCNC: 8 MG/DL (ref 8.5–10.1)
CHLORIDE SERPL-SCNC: 108 MMOL/L (ref 97–108)
CO2 SERPL-SCNC: 27 MMOL/L (ref 21–32)
CREAT SERPL-MCNC: 1.17 MG/DL (ref 0.55–1.02)
EKG ATRIAL RATE: 119 BPM
EKG DIAGNOSIS: NORMAL
EKG P AXIS: 34 DEGREES
EKG P-R INTERVAL: 152 MS
EKG Q-T INTERVAL: 314 MS
EKG QRS DURATION: 88 MS
EKG QTC CALCULATION (BAZETT): 441 MS
EKG R AXIS: 20 DEGREES
EKG T AXIS: 57 DEGREES
EKG VENTRICULAR RATE: 119 BPM
ERYTHROCYTE [DISTWIDTH] IN BLOOD BY AUTOMATED COUNT: 13.5 % (ref 11.5–14.5)
GLOBULIN SER CALC-MCNC: 3.9 G/DL (ref 2–4)
GLUCOSE SERPL-MCNC: 106 MG/DL (ref 65–100)
HCT VFR BLD AUTO: 33.1 % (ref 35–47)
HGB BLD-MCNC: 10.5 G/DL (ref 11.5–16)
MAGNESIUM SERPL-MCNC: 1.8 MG/DL (ref 1.6–2.4)
MCH RBC QN AUTO: 27.1 PG (ref 26–34)
MCHC RBC AUTO-ENTMCNC: 31.7 G/DL (ref 30–36.5)
MCV RBC AUTO: 85.5 FL (ref 80–99)
NRBC # BLD: 0 K/UL (ref 0–0.01)
NRBC BLD-RTO: 0 PER 100 WBC
PHOSPHATE SERPL-MCNC: 3.1 MG/DL (ref 2.6–4.7)
PLATELET # BLD AUTO: 196 K/UL (ref 150–400)
PMV BLD AUTO: 11.6 FL (ref 8.9–12.9)
POTASSIUM SERPL-SCNC: 3.4 MMOL/L (ref 3.5–5.1)
PROT SERPL-MCNC: 6.7 G/DL (ref 6.4–8.2)
RBC # BLD AUTO: 3.87 M/UL (ref 3.8–5.2)
SODIUM SERPL-SCNC: 138 MMOL/L (ref 136–145)
VANCOMYCIN SERPL-MCNC: 8 UG/ML
WBC # BLD AUTO: 13.9 K/UL (ref 3.6–11)

## 2024-02-05 PROCEDURE — 2580000003 HC RX 258: Performed by: INTERNAL MEDICINE

## 2024-02-05 PROCEDURE — 95816 EEG AWAKE AND DROWSY: CPT

## 2024-02-05 PROCEDURE — 1100000000 HC RM PRIVATE

## 2024-02-05 PROCEDURE — 70551 MRI BRAIN STEM W/O DYE: CPT

## 2024-02-05 PROCEDURE — 80202 ASSAY OF VANCOMYCIN: CPT

## 2024-02-05 PROCEDURE — 97535 SELF CARE MNGMENT TRAINING: CPT | Performed by: OCCUPATIONAL THERAPIST

## 2024-02-05 PROCEDURE — 97166 OT EVAL MOD COMPLEX 45 MIN: CPT | Performed by: OCCUPATIONAL THERAPIST

## 2024-02-05 PROCEDURE — 97530 THERAPEUTIC ACTIVITIES: CPT

## 2024-02-05 PROCEDURE — 99222 1ST HOSP IP/OBS MODERATE 55: CPT | Performed by: INTERNAL MEDICINE

## 2024-02-05 PROCEDURE — 36415 COLL VENOUS BLD VENIPUNCTURE: CPT

## 2024-02-05 PROCEDURE — 85027 COMPLETE CBC AUTOMATED: CPT

## 2024-02-05 PROCEDURE — 83735 ASSAY OF MAGNESIUM: CPT

## 2024-02-05 PROCEDURE — 6370000000 HC RX 637 (ALT 250 FOR IP): Performed by: INTERNAL MEDICINE

## 2024-02-05 PROCEDURE — 80053 COMPREHEN METABOLIC PANEL: CPT

## 2024-02-05 PROCEDURE — 84100 ASSAY OF PHOSPHORUS: CPT

## 2024-02-05 PROCEDURE — 6360000002 HC RX W HCPCS: Performed by: INTERNAL MEDICINE

## 2024-02-05 PROCEDURE — 95816 EEG AWAKE AND DROWSY: CPT | Performed by: INTERNAL MEDICINE

## 2024-02-05 PROCEDURE — 92610 EVALUATE SWALLOWING FUNCTION: CPT

## 2024-02-05 PROCEDURE — 97162 PT EVAL MOD COMPLEX 30 MIN: CPT

## 2024-02-05 RX ORDER — ASPIRIN 81 MG/1
81 TABLET, CHEWABLE ORAL DAILY
COMMUNITY

## 2024-02-05 RX ORDER — HALOPERIDOL 2 MG/ML
2 SOLUTION ORAL EVERY 6 HOURS PRN
Status: DISCONTINUED | OUTPATIENT
Start: 2024-02-05 | End: 2024-02-09 | Stop reason: HOSPADM

## 2024-02-05 RX ORDER — HALOPERIDOL 2 MG/ML
2 SOLUTION ORAL EVERY 6 HOURS PRN
Status: DISCONTINUED | OUTPATIENT
Start: 2024-02-05 | End: 2024-02-05 | Stop reason: SDUPTHER

## 2024-02-05 RX ORDER — LORAZEPAM 2 MG/ML
1 CONCENTRATE ORAL ONCE
Status: COMPLETED | OUTPATIENT
Start: 2024-02-05 | End: 2024-02-05

## 2024-02-05 RX ADMIN — PIPERACILLIN AND TAZOBACTAM 3375 MG: 3; .375 INJECTION, POWDER, FOR SOLUTION INTRAVENOUS; PARENTERAL at 09:19

## 2024-02-05 RX ADMIN — ACYCLOVIR SODIUM 290 MG: 50 INJECTION, SOLUTION INTRAVENOUS at 12:18

## 2024-02-05 RX ADMIN — SODIUM CHLORIDE, PRESERVATIVE FREE 10 ML: 5 INJECTION INTRAVENOUS at 20:59

## 2024-02-05 RX ADMIN — SODIUM CHLORIDE: 9 INJECTION, SOLUTION INTRAVENOUS at 12:31

## 2024-02-05 RX ADMIN — LORAZEPAM 1 MG: 2 SOLUTION, CONCENTRATE ORAL at 15:39

## 2024-02-05 RX ADMIN — SODIUM CHLORIDE: 9 INJECTION, SOLUTION INTRAVENOUS at 12:33

## 2024-02-05 RX ADMIN — ACYCLOVIR SODIUM 700 MG: 50 INJECTION, SOLUTION INTRAVENOUS at 18:07

## 2024-02-05 RX ADMIN — PIPERACILLIN AND TAZOBACTAM 3375 MG: 3; .375 INJECTION, POWDER, FOR SOLUTION INTRAVENOUS; PARENTERAL at 01:40

## 2024-02-05 RX ADMIN — ACYCLOVIR SODIUM 290 MG: 50 INJECTION, SOLUTION INTRAVENOUS at 11:21

## 2024-02-05 RX ADMIN — MELATONIN 3 MG: at 20:51

## 2024-02-05 RX ADMIN — SODIUM CHLORIDE: 9 INJECTION, SOLUTION INTRAVENOUS at 17:24

## 2024-02-05 RX ADMIN — ACYCLOVIR SODIUM 290 MG: 50 INJECTION, SOLUTION INTRAVENOUS at 02:26

## 2024-02-05 RX ADMIN — VANCOMYCIN HYDROCHLORIDE 1000 MG: 1 INJECTION, POWDER, LYOPHILIZED, FOR SOLUTION INTRAVENOUS at 20:59

## 2024-02-05 RX ADMIN — SODIUM CHLORIDE: 9 INJECTION, SOLUTION INTRAVENOUS at 01:48

## 2024-02-05 RX ADMIN — SODIUM CHLORIDE, PRESERVATIVE FREE 10 ML: 5 INJECTION INTRAVENOUS at 09:13

## 2024-02-05 RX ADMIN — PIPERACILLIN AND TAZOBACTAM 3375 MG: 3; .375 INJECTION, POWDER, FOR SOLUTION INTRAVENOUS; PARENTERAL at 17:22

## 2024-02-05 NOTE — PROGRESS NOTES
Hospitalist Progress Note    NAME:   Baylee Martin   : 1950   MRN: [id number]     Date/Time: 2024 2:41 PM  Patient PCP: No primary care provider on file.    Estimated discharge date:>48hrs   Barriers: mentation imprvt, lp, fever workup      Assessment / Plan:    Altered mental status, rule out meningitis  Question of undiagnosed mild dementia  History of stroke on aspirin and Xarelto  CT head is negative for any acute process.  Serum alcohol negative.  Urine toxicology within normal limits.  The patient developed fever in the ED course.  Continue to monitor patient in telemetry with neurochecks every 4 hourly.  Bcx-neg   UA negcovid, flu neg  Cxr-no PNA  TSH nl  Plan LP tmrw  EEG-no seizure  MRI pending  cont on IV Zosyn, IV vancomycin, IV acyclovir.  Neurology consult appreciated   Will cont abx/antivirals until lp and meningitis pnl return  Less likely meningitis/encephalitis given fluctuating confusion per neuro    Dc diazepam,  Can use haldol prn for agitation (qtc 441     Tachycardia  Echo pending  resolved     Hypokalemia  replete prn          Medical Decision Making:   I personally reviewed labs:cbc bmp  I personally reviewed imaging:  I personally reviewed EKG:  Toxic drug monitoring:   Discussed case with: pt daughter, idr, rn  neuro  pharm      Code Status: full  DVT Prophylaxis: was on xarelto at home, which is on hold, prophylatic lovenox held for lp  GI Prophylaxis:    Subjective:     Chief Complaint / Reason for Physician Visit  \"Pt was aox3 in am with neuro, when I went temp 99.5, pt was shaking , answering somequestions, daughter bedside. Per rn very forgetful and asking repetitively and pulling line\".  Discussed with RN events overnight.       Objective:     VITALS:   Last 24hrs VS reviewed since prior progress note. Most recent are:  Patient Vitals for the past 24 hrs:   BP Temp Temp src Pulse Resp SpO2   24 1131 (!) 163/76 99.5 °F (37.5 °C) -- (!) 103 18 95 %   24 0754  138   K 3.4* 3.4*   * 108   CO2 26 27   GLUCOSE 104* 106*   BUN 23* 14   CREATININE 1.24* 1.17*   CALCIUM 8.5 8.0*   MG 1.8 1.8   PHOS  --  3.1   LABALBU 3.4* 2.8*   BILITOT 0.1* 0.4   AST 13* 19   ALT 19 15       Signed: Chantel Ho MD

## 2024-02-05 NOTE — PLAN OF CARE
Problem: Confusion  Goal: Confusion, delirium, dementia, or psychosis is improved or at baseline  Description: INTERVENTIONS:  1. Assess for possible contributors to thought disturbance, including medications, impaired vision or hearing, underlying metabolic abnormalities, dehydration, psychiatric diagnoses, and notify attending LIP  2. [place]w high risk fall precautions, as indicated  3. Provide frequent short contacts to provide reality reorientation, refocusing and direction  4. Decrease environmental stimuli, including noise as appropriate  5. Monitor and intervene to maintain adequate nutrition, hydration, elimination, sleep and activity  6. If unable to ensure safety without constant attention obtain sitter and review sitter guidelines with assigned personnel  7. Initiate Psychosocial CNS and Spiritual Care consult, as indicated  Outcome: Progressing  Flowsheets (Taken 2/4/2024 2226)  Effect of thought disturbance (confusion, delirium, dementia, or psychosis) are managed with adequate functional status:   Assess for contributors to thought disturbance, including medications, impaired vision or hearing, underlying metabolic abnormalities, dehydration, psychiatric diagnoses, notify LIP   [place]w high risk fall precautions, as indicated   Provide frequent short contacts to provide reality reorientation, refocusing and direction   Monitor and intervene to maintain adequate nutrition, hydration, elimination, sleep and activity   Decrease environmental stimuli, including noise as appropriate     Problem: ABCDS Injury Assessment  Goal: Absence of physical injury  Outcome: Progressing  Flowsheets (Taken 2/4/2024 2226)  Absence of Physical Injury: Implement safety measures based on patient assessment     Problem: Safety - Adult  Goal: Free from fall injury  Outcome: Progressing  Flowsheets (Taken 2/4/2024 2226)  Free From Fall Injury:   Instruct family/caregiver on patient safety   Based on caregiver fall risk

## 2024-02-05 NOTE — PROGRESS NOTES
Speech LAnguage Pathology EVALUATION/DISCHARGE    Patient: Baylee Martin (73 y.o. female)  Date: 2/5/2024  Primary Diagnosis: Confusion [R41.0]  Altered mental status [R41.82]  Tachycardia [R00.0]  Disorientation [R41.0]       Precautions:                     ASSESSMENT :  Based on the objective data described below, the patient presents with a grossly functional oropharyngeal swallow. Patient Ox3 upon SLP arrival, getting set-up with breakfast tray by EEG Tech. Patient's mastication assessed to be grossly functional, resulting in adequate oral clearance given increased time. Patient self-fed rapid, sequential sips of thin liquid via cup without overt difficulty or overt signs of aspiration.    Note CT Head showed \"no acute abnormality.\" MRI pending. Patient cleared to continue Regular/Thin Liquid diet with assistance with meal set-up and general aspiration precautions outlined below. Please re-consult if MRI indicates acute infarct. SLP to sign off.    Patient will be discharged from skilled speech-language pathology services at this time.     PLAN :  Recommendations and Planned Interventions:  Diet: Regular and thin liquids  -- Medication as tolerated  -- Assistance with meal set-up  -- Routine oral care as able  -- General aspiration precautions: upright for all PO, small bites/sips, slow rate of intake, alternate liquids and solids as needed       Acute SLP Services: No, patient will be discharged from acute skilled speech-language pathology at this time. Please re-consult if MRI indicates acute infarct.    Discharge Recommendations: No, additional SLP treatment not indicated at discharge     SUBJECTIVE:   Patient stated, “thank you so much for your help.”    OBJECTIVE:   History reviewed. No pertinent past medical history.No past surgical history on file.    Prior Level of Function/Home Situation:        Baseline Assessment:  Current Diet : Regular  Current Liquid Diet : Thin          Cognitive and Communication

## 2024-02-05 NOTE — PROGRESS NOTES
Pharmacy Antimicrobial Kinetic Dosing    Indication for Antimicrobials: Spesi, AMS, rule out meningitis (on Rivaroxaban, so no LP)    Current Regimen of Each Antimicrobial:  Vancomycin 1750 mg IV x1 ; Start Date  ; Day # 1 of   Zosyn 4.5 gm IV x1 then 3.375 gm IV q 8h; Start Date  ; Day # 1 of 7    Previous Antimicrobial Therapy:      Goal Level: Vancomycin -600    Date Dose & Interval Measured (mcg/mL) Predicted AUC                       Significant Cultures:     Blood = pending    Labs:  Recent Labs     Units 24  1108   CREATININE MG/DL 1.24*   BUN MG/DL 23*   WBC K/uL 10.9     Temp (24hrs), Av.9 °F (37.7 °C), Min:97.8 °F (36.6 °C), Max:102.8 °F (39.3 °C)      Conditions for Dosing Consideration: None    Creatinine Clearance (mL/min): Estimated Creatinine Clearance: 37 mL/min (A) (based on SCr of 1.24 mg/dL (H)).       Impression/Plan:   Will continue Vancomycin with 750 mg IV q 12h for AUC of 440  Daily BMP per Vancomycin dosing protocol   Antimicrobial stop date:  Vancomycin = 7 days     Pharmacy will follow daily and adjust medications as appropriate for renal function and/or serum levels.    Thank you,  ULYSSES NAYLOR [facility]

## 2024-02-05 NOTE — PLAN OF CARE
ARSEN Oropeza. (1999). Measuring the change indisability after inpatient rehabilitation; comparison of the responsiveness of the Barthel Index and Functional Holmes Measure. Journal of Neurology, Neurosurgery, and Psychiatry, 66(4), 480-484.  BARRIE Moss, RONDA Walton, & GREER Sterling (2004.) Assessment of post-stroke quality of life in cost-effectiveness studies: The usefulness of the Barthel Index and the EuroQoL-5D. Quality of Life Research, 13, 427-43       Activity Tolerance:   Poor and requires frequent rest breaks    After treatment:   Patient left in no apparent distress in bed, Call bell within reach, Bed/ chair alarm activated, Caregiver / family present, Side rails x3, and Updated patient's board on functional status and mobility recommendations    COMMUNICATION/EDUCATION:   The patient's plan of care was discussed with: physical therapist, registered nurse, and patient's daughter    Patient Education  Education Given To: Family;Patient  Education Provided: Role of Therapy;Plan of Care;Fall Prevention Strategies  Education Method: Verbal  Barriers to Learning: None;Cognition  Education Outcome: Verbalized understanding;Continued education needed    Thank you for this referral.  Meghan Bermudez OTR/L  Minutes: 24    Occupational Therapy Evaluation Charge Determination   History Examination Decision-Making   MEDIUM Complexity : Expanded review of history including physical, cognitive and psychocial history  MEDIUM Complexity: 3-5 Performance deficits relating to physical, cognitive, or psychosocial skills that result in activity limitations and/or participation restrictions MEDIUM Complexity: Patient may present with comorbidities that affect occupational performance. Minimal to moderate modifications of tasks or assist (eg. physical or verbal) with assist is necessary to enable pt to complete eval   Based on the above components, the patient evaluation is determined to be of

## 2024-02-05 NOTE — PROGRESS NOTES
Attempted to see pt for OT services.  Pt was finishing up with EEG and then had SLP at bedside.  Will defer but continue to follow.

## 2024-02-05 NOTE — PROGRESS NOTES
End of Shift Note    Bedside shift change report given to Stella CANO (oncoming nurse) by Prudencio Garza RN (offgoing nurse).  Report included the following information Nurse Handoff Report      Shift worked:  Days   Shift summary and any significant changes:     PT/OT worked with patient. Patient had MRI and EEG.        Concerns for physician to address:  None   Zone phone for oncoming shift:   [phone]     Patient Information  Baylee Martin  73 y.o.  2/4/2024 10:23 AM by Yvette Toney MD. Baylee Martin was admitted from Home    Problem List  Patient Active Problem List    Diagnosis Date Noted    Altered mental status 02/04/2024    CVA (cerebral vascular accident) (HCC) 06/20/2018    Chronic hepatitis C without hepatic coma (HCC) 03/23/2018    High cholesterol 03/23/2018     History reviewed. No pertinent past medical history.    Core Measures:  CVA: No Yes  CHF:No No  PNA:NoNo    Activity:  Activity: In bed  Number times ambulated in hallways past shift: 0  Number of times OOB to chair past shift: 0    Cardiac:   Cardiac Monitoring: Yes      Cardiac Rhythm: Sinus tachy    Access:   Current line(s): PIV      Genitourinary:   Urinary status: voiding , external catheter      Respiratory:   O2 Device: None (Room air)  Chronic home O2 use?: no  Incentive spirometer at bedside: no       GI:  Last BM (including prior to admit): 02/04/24  Current diet:  ADULT DIET; Regular  Passing flatus: yes  Tolerating current diet: yes       Pain Management:   Patient states pain is manageable on current regimen: yes    Skin:  Boom Scale Score: 17  Interventions: float heels, increase time out of bed, and PT/OT consult    Patient Safety:  Fall Score: [place] Total Score: 75  Interventions: bed/chair alarm, assistive devices (walker, cane, etc.), gripper socks, pt to call before getting OOB, and stay with me (per policy)       DVT prophylaxis:  DVT prophylaxis Med- no  DVT prophylaxis SCD or JOY- yes     Wounds: (If

## 2024-02-05 NOTE — PROGRESS NOTES
Paged that the patient is confused again and has a low grade fever. Abx continued for CNS coverage, acyclovir Dc'd.   Given the fluctuating nature, I suspect this is delirium in the setting of underlying undiagnosed MCI/or possible dementia, however agree with continuing CNS coverage meningitis including abx and antivirals, until LP is done and meningitis/encephalitis PCR panel is negative.   Please set up IR Guided LP.   Added on CSF labs.     Pending brain MRI.     Nonbillable note.

## 2024-02-05 NOTE — PROGRESS NOTES
Orders received, chart reviewed and patient evaluated by physical therapy. Pending progression with skilled acute physical therapy, recommend:  Therapy up to 5 days/week in Skilled nursing facility    Recommend with nursing OOB to chair 3x/day and walking daily with 2 assist stand pivot. Thank you for completing as able in order to maintain patient strength, endurance and independence.     Full evaluation to follow.

## 2024-02-05 NOTE — PLAN OF CARE
Problem: Physical Therapy - Adult  Goal: By Discharge: Performs mobility at highest level of function for planned discharge setting.  See evaluation for individualized goals.  Description: FUNCTIONAL STATUS PRIOR TO ADMISSION: Patient was modified independent using a rollator for functional mobility.    HOME SUPPORT PRIOR TO ADMISSION: The patient lived with daughter.    Physical Therapy Goals  Initiated 2/5/2024  1.  Patient will move from supine to sit and sit to supine in bed with contact guard assist within 7 day(s).    2.  Patient will perform sit to stand with minimal assistance within 7 day(s).  3.  Patient will transfer from bed to chair and chair to bed with minimal assistance using the least restrictive device within 7 day(s).  4.  Patient will ambulate with minimal assistance for 15 feet with the least restrictive device within 7 day(s).       Outcome: Progressing   PHYSICAL THERAPY EVALUATION    Patient: Baylee Martin (73 y.o. female)  Date: 2/5/2024  Primary Diagnosis: Confusion [R41.0]  Altered mental status [R41.82]  Tachycardia [R00.0]  Disorientation [R41.0]       Precautions: Restrictions/Precautions: Fall Risk, Bed Alarm                      ASSESSMENT :   DEFICITS/IMPAIRMENTS:   The patient is limited by decreased functional mobility, ROM, strength, activity tolerance, endurance, cognition, coordination, balance     Based on the impairments listed above pt is well below functional baseline. Pt was received in supine with daughter at bedside and cleared by nursing to mobilize. Pt confused and unintelligible talking at times. She was able to mobilize to the EOB with significant cueing both manual and verbal. Able to stand with RW, noted significant posterior lean. She was able to pivot to a BSC. She needed constant cues and mod A to counter posterior lean. Stood again and pivoted back to the bed. At times she had difficulty with command following. Returned to supine. Activity apron provided.

## 2024-02-05 NOTE — PROCEDURES
EEG REPORT    Patient Name: Baylee Martin  : 1950  Age: 73 y.o.    Ordering physician: No referring provider defined for this encounter.  Date of EEG start time: 2024 at 8:15 AM  Date of EEG end time: 2024 at 8:53 AM  EEG procedure number: [id number]  Diagnosis: Altered mental status  Interpreting physician: Xenia Bynum D.O.      Procedure: EEG    CLINICAL INDICATION: The patient is a 73 y.o. female who is being evaluated for baseline electro cerebral activities and to rule out seizure focus.      Current Facility-Administered Medications   Medication Dose Route Frequency    traZODone (DESYREL) tablet 100 mg  100 mg Oral Once    sodium chloride flush 0.9 % injection 5-40 mL  5-40 mL IntraVENous 2 times per day    sodium chloride flush 0.9 % injection 5-40 mL  5-40 mL IntraVENous PRN    0.9 % sodium chloride infusion   IntraVENous PRN    [Held by provider] enoxaparin (LOVENOX) injection 40 mg  40 mg SubCUTAneous Daily    ondansetron (ZOFRAN-ODT) disintegrating tablet 4 mg  4 mg Oral Q8H PRN    Or    ondansetron (ZOFRAN) injection 4 mg  4 mg IntraVENous Q6H PRN    polyethylene glycol (GLYCOLAX) packet 17 g  17 g Oral Daily PRN    acetaminophen (TYLENOL) tablet 650 mg  650 mg Oral Q6H PRN    Or    acetaminophen (TYLENOL) suppository 650 mg  650 mg Rectal Q6H PRN    0.9 % sodium chloride infusion   IntraVENous Continuous    melatonin tablet 3 mg  3 mg Oral Nightly    aluminum & magnesium hydroxide-simethicone (MAALOX) 200-200-20 MG/5ML suspension 30 mL  30 mL Oral Q6H PRN    diazePAM (VALIUM) injection 5 mg  5 mg IntraVENous Q4H PRN    piperacillin-tazobactam (ZOSYN) 3,375 mg in sodium chloride 0.9 % 50 mL IVPB (mini-bag)  3,375 mg IntraVENous Q8H    labetalol (NORMODYNE;TRANDATE) injection syringe 10 mg  10 mg IntraVENous Q4H PRN    acyclovir (ZOVIRAX) 290 mg in sodium chloride 0.9 % 250 mL IVPB  5 mg/kg (Adjusted) IntraVENous Q8H    vancomycin (VANCOCIN) 750 mg in sodium chloride 0.9 % 250 mL IVPB

## 2024-02-05 NOTE — CONSULTS
Date of Consultation:  February 5, 2024    Referring Physician: MD Vic     Reason for Consultation:  AMS     Chief Complaint   Patient presents with    Altered Mental Status     Daughter called EMS for acute confusion.  Mother screaming and very agitated on EMS arrival.  Had to be given 5 mg IM Haldol by EMS prior to arrival.       History of Present Illness:   Baylee Martin is a 73 y.o. female with no known past medical history, reports history of stroke with right lower extremity weakness as a deficit, is on Xarelto reportedly for unknown reason, who presents with confusion, agitation and restlessness reported by daughter.  There is question of possible undiagnosed mild dementia at baseline.  There was apparently a similar incident last year while she was eating in a restaurant but this was attributed to alcohol per notes.    Daughter on phone states that she woke up and saw the patient in the kitchen with a pancake that had fallen on the floor and syrup everywhere on the counter. Patient was confused as to what happened. Had a hard time explaining what happened. No clear language or focal deficit, but was confused which was new for her. Has no history of seizures.    She was tachycardic with heart rate of 118.  Head CT was negative for any acute process.  She had hypokalemia 3.4, BUN/creatinine mildly elevated.  Serum alcohol was negative.  TSH was normal.  Urine tox negative.  UA was normal.  She received IV Valium and Zyprexa for agitation.      Apparently overnight she spiked a fever of 102 and there was concern for possible CNS infection.  IR was unable to do a lumbar puncture given that she was on Xarelto.  She did receive 1 dose of antibiotics and antiviral.    This morning she is not altered, in fact she is oriented x 3 and able to provide history.  She is very calm and cooperative.  She states that she is not sure what happened last night but she felt very confused about something and this concerned her  to patient spiking a fever of 102, and was prophylactically started on CNS coverage however this morning she is completely alert and oriented without any focal neurological deficits, does not complain of any headache and is not altered.  This presentation does not sound suspicious for CNS infection as you would not expect an improvement this quickly with only 1 dose of antibiotics.  Certainly not suspicious for encephalitis.  Patient has not had any further fevers.    I do not have a high suspicion for meningitis and recommend restarting patient's Xarelto as I do not feel that she needs an LP.  Can DC antibiotic and antivirals.      Patient is no longer altered.  She may carry an undiagnosed neurocognitive process such as mild cognitive impairment or dementia which would need to be further delineated in the clinic setting with formal neuropsych testing.    EEG does show some mild slowing in the right hemisphere.  Given this focal structural deficit, I do think she warrants having an MRI of the brain.    # Altered mental status, resolved  - MRI of the brain pending  - EEG reviewed, slowing in the right hemisphere mild  - DC antibiotics and antivirals, no suspicion for CNS process  - Does not need LP  - Resume patient's blood thinner if no other indication to hold     # Possible underlying neurocognitive disease, MCI?  - Patient will need formal neuropsych testing in the outpatient setting.      Thank you very much for this consultation.  If MRI brain is nonacute, neurology will sign off.  Patient will need outpatient neurology follow-up in 6 to 8 weeks for formal neuropsych testing.      I spent 55 minutes providing care to this acutely ill inpatient with > 50% of the time counseling as well as reviewing the patient's chart, notes, labs, medications and preparing documentation along with assisting in the coordination of care of the patient on the patient's hospital floor/unit.       Xenia Bynum DO

## 2024-02-05 NOTE — PROGRESS NOTES
MRI PENDING    MRI SCREENING SHEET NEEDS TO BE COMPLETED AND SIGNED    CALL [phone] WHEN THIS IS DONE    FAX [phone]

## 2024-02-06 ENCOUNTER — APPOINTMENT (OUTPATIENT)
Facility: HOSPITAL | Age: 74
DRG: 871 | End: 2024-02-06
Attending: INTERNAL MEDICINE
Payer: MEDICARE

## 2024-02-06 ENCOUNTER — APPOINTMENT (OUTPATIENT)
Facility: HOSPITAL | Age: 74
DRG: 871 | End: 2024-02-06
Payer: MEDICARE

## 2024-02-06 PROBLEM — R41.0 CONFUSION: Status: ACTIVE | Noted: 2024-02-06

## 2024-02-06 LAB
ALBUMIN SERPL-MCNC: 2.9 G/DL (ref 3.5–5)
ALBUMIN/GLOB SERPL: 0.7 (ref 1.1–2.2)
ALP SERPL-CCNC: 54 U/L (ref 45–117)
ALT SERPL-CCNC: 17 U/L (ref 12–78)
ANION GAP SERPL CALC-SCNC: 6 MMOL/L (ref 5–15)
APPEARANCE CSF: CLEAR
AST SERPL-CCNC: 20 U/L (ref 15–37)
BASOPHILS # BLD: 0 K/UL (ref 0–0.1)
BASOPHILS NFR BLD: 0 % (ref 0–1)
BILIRUB SERPL-MCNC: 0.3 MG/DL (ref 0.2–1)
BUN SERPL-MCNC: 15 MG/DL (ref 6–20)
BUN/CREAT SERPL: 13 (ref 12–20)
C GATTII+NEOFOR DNA CSF QL NAA+NON-PROBE: NOT DETECTED
CALCIUM SERPL-MCNC: 7.9 MG/DL (ref 8.5–10.1)
CHLORIDE SERPL-SCNC: 108 MMOL/L (ref 97–108)
CMV DNA CSF QL NAA+NON-PROBE: NOT DETECTED
CO2 SERPL-SCNC: 25 MMOL/L (ref 21–32)
COLOR CSF: COLORLESS
COMMENT:: NORMAL
CREAT SERPL-MCNC: 1.12 MG/DL (ref 0.55–1.02)
DIFFERENTIAL METHOD BLD: ABNORMAL
E COLI K1 DNA CSF QL NAA+NON-PROBE: NOT DETECTED
ECHO AO ASC DIAM: 3.7 CM
ECHO AO ASCENDING AORTA INDEX: 2.18 CM/M2
ECHO AO ROOT DIAM: 3.2 CM
ECHO AO ROOT INDEX: 1.88 CM/M2
ECHO AV AREA PEAK VELOCITY: 1.6 CM2
ECHO AV AREA/BSA PEAK VELOCITY: 0.9 CM2/M2
ECHO AV PEAK GRADIENT: 12 MMHG
ECHO AV PEAK VELOCITY: 1.7 M/S
ECHO AV VELOCITY RATIO: 0.53
ECHO BSA: 1.73 M2
ECHO LA DIAMETER INDEX: 2.24 CM/M2
ECHO LA DIAMETER: 3.8 CM
ECHO LA TO AORTIC ROOT RATIO: 1.19
ECHO LV E' LATERAL VELOCITY: 8 CM/S
ECHO LV EDV A4C: 62 ML
ECHO LV EDV INDEX A4C: 36 ML/M2
ECHO LV EJECTION FRACTION A4C: 65 %
ECHO LV ESV A4C: 21 ML
ECHO LV ESV INDEX A4C: 12 ML/M2
ECHO LV FRACTIONAL SHORTENING: 26 % (ref 28–44)
ECHO LV INTERNAL DIMENSION DIASTOLE INDEX: 2.29 CM/M2
ECHO LV INTERNAL DIMENSION DIASTOLIC: 3.9 CM (ref 3.9–5.3)
ECHO LV INTERNAL DIMENSION SYSTOLIC INDEX: 1.71 CM/M2
ECHO LV INTERNAL DIMENSION SYSTOLIC: 2.9 CM
ECHO LV IVSD: 1 CM (ref 0.6–0.9)
ECHO LV MASS 2D: 105.3 G (ref 67–162)
ECHO LV MASS INDEX 2D: 62 G/M2 (ref 43–95)
ECHO LV POSTERIOR WALL DIASTOLIC: 0.8 CM (ref 0.6–0.9)
ECHO LV RELATIVE WALL THICKNESS RATIO: 0.41
ECHO LVOT AREA: 2.8 CM2
ECHO LVOT DIAM: 1.9 CM
ECHO LVOT PEAK GRADIENT: 4 MMHG
ECHO LVOT PEAK VELOCITY: 0.9 M/S
ECHO MV A VELOCITY: 0.89 M/S
ECHO MV E DECELERATION TIME (DT): 186.9 MS
ECHO MV E VELOCITY: 0.8 M/S
ECHO MV E/A RATIO: 0.9
ECHO MV E/E' LATERAL: 10
ECHO PV MAX VELOCITY: 0.9 M/S
ECHO PV PEAK GRADIENT: 3 MMHG
ECHO RV FREE WALL PEAK S': 13 CM/S
ECHO RV TAPSE: 2.8 CM (ref 1.7–?)
EOSINOPHIL # BLD: 0.1 K/UL (ref 0–0.4)
EOSINOPHIL NFR BLD: 1 % (ref 0–7)
ERYTHROCYTE [DISTWIDTH] IN BLOOD BY AUTOMATED COUNT: 13.5 % (ref 11.5–14.5)
EV RNA CSF QL NAA+NON-PROBE: NOT DETECTED
GLOBULIN SER CALC-MCNC: 4.1 G/DL (ref 2–4)
GLUCOSE CSF-MCNC: 68 MG/DL (ref 40–70)
GLUCOSE SERPL-MCNC: 141 MG/DL (ref 65–100)
GP B STREP DNA CSF QL NAA+NON-PROBE: NOT DETECTED
HAEM INFLU DNA CSF QL NAA+NON-PROBE: NOT DETECTED
HCT VFR BLD AUTO: 33.1 % (ref 35–47)
HGB BLD-MCNC: 10.6 G/DL (ref 11.5–16)
HHV6 DNA CSF QL NAA+NON-PROBE: NOT DETECTED
HSV1 DNA CSF QL NAA+PROBE: NOT DETECTED
HSV2 DNA CSF QL NAA+NON-PROBE: NOT DETECTED
IMM GRANULOCYTES # BLD AUTO: 0.1 K/UL (ref 0–0.04)
IMM GRANULOCYTES NFR BLD AUTO: 0 % (ref 0–0.5)
L MONOCYTOG DNA CSF QL NAA+NON-PROBE: NOT DETECTED
LYMPHOCYTES # BLD: 3.3 K/UL (ref 0.8–3.5)
LYMPHOCYTES NFR BLD: 24 % (ref 12–49)
MCH RBC QN AUTO: 27 PG (ref 26–34)
MCHC RBC AUTO-ENTMCNC: 32 G/DL (ref 30–36.5)
MCV RBC AUTO: 84.4 FL (ref 80–99)
MONOCYTES # BLD: 1.2 K/UL (ref 0–1)
MONOCYTES NFR BLD: 9 % (ref 5–13)
N MEN DNA CSF QL NAA+NON-PROBE: NOT DETECTED
NEUTS SEG # BLD: 9.2 K/UL (ref 1.8–8)
NEUTS SEG NFR BLD: 66 % (ref 32–75)
NRBC # BLD: 0 K/UL (ref 0–0.01)
NRBC BLD-RTO: 0 PER 100 WBC
PARECHOVIRUS A RNA CSF QL NAA+NON-PROBE: NOT DETECTED
PLATELET # BLD AUTO: 188 K/UL (ref 150–400)
PMV BLD AUTO: 11.9 FL (ref 8.9–12.9)
POTASSIUM SERPL-SCNC: 3.3 MMOL/L (ref 3.5–5.1)
PROT CSF-MCNC: 74 MG/DL (ref 15–45)
PROT SERPL-MCNC: 7 G/DL (ref 6.4–8.2)
RBC # BLD AUTO: 3.92 M/UL (ref 3.8–5.2)
RBC # CSF: 0 /CU MM
S PNEUM DNA CSF QL NAA+NON-PROBE: NOT DETECTED
SODIUM SERPL-SCNC: 139 MMOL/L (ref 136–145)
SPECIMEN HOLD: NORMAL
TUBE # CSF: ABNORMAL
TUBE # CSF: NORMAL
TUBE # CSF: NORMAL
VANCOMYCIN SERPL-MCNC: 7.3 UG/ML
VZV DNA CSF QL NAA+NON-PROBE: NOT DETECTED
WBC # BLD AUTO: 13.9 K/UL (ref 3.6–11)
WBC # CSF: 0 /CU MM (ref 0–5)

## 2024-02-06 PROCEDURE — 2580000003 HC RX 258: Performed by: INTERNAL MEDICINE

## 2024-02-06 PROCEDURE — 6360000002 HC RX W HCPCS: Performed by: INTERNAL MEDICINE

## 2024-02-06 PROCEDURE — 36415 COLL VENOUS BLD VENIPUNCTURE: CPT

## 2024-02-06 PROCEDURE — 1100000000 HC RM PRIVATE

## 2024-02-06 PROCEDURE — 85025 COMPLETE CBC W/AUTO DIFF WBC: CPT

## 2024-02-06 PROCEDURE — 6370000000 HC RX 637 (ALT 250 FOR IP): Performed by: INTERNAL MEDICINE

## 2024-02-06 PROCEDURE — 82945 GLUCOSE OTHER FLUID: CPT

## 2024-02-06 PROCEDURE — 86788 WEST NILE VIRUS AB IGM: CPT

## 2024-02-06 PROCEDURE — 89050 BODY FLUID CELL COUNT: CPT

## 2024-02-06 PROCEDURE — 87205 SMEAR GRAM STAIN: CPT

## 2024-02-06 PROCEDURE — 87070 CULTURE OTHR SPECIMN AEROBIC: CPT

## 2024-02-06 PROCEDURE — 009U3ZX DRAINAGE OF SPINAL CANAL, PERCUTANEOUS APPROACH, DIAGNOSTIC: ICD-10-PCS | Performed by: RADIOLOGY

## 2024-02-06 PROCEDURE — 80202 ASSAY OF VANCOMYCIN: CPT

## 2024-02-06 PROCEDURE — 93308 TTE F-UP OR LMTD: CPT

## 2024-02-06 PROCEDURE — 86592 SYPHILIS TEST NON-TREP QUAL: CPT

## 2024-02-06 PROCEDURE — 87015 SPECIMEN INFECT AGNT CONCNTJ: CPT

## 2024-02-06 PROCEDURE — 87529 HSV DNA AMP PROBE: CPT

## 2024-02-06 PROCEDURE — 84157 ASSAY OF PROTEIN OTHER: CPT

## 2024-02-06 PROCEDURE — 2709999900 FL LUMBAR PUNCTURE DIAG

## 2024-02-06 PROCEDURE — 99233 SBSQ HOSP IP/OBS HIGH 50: CPT

## 2024-02-06 PROCEDURE — 86789 WEST NILE VIRUS ANTIBODY: CPT

## 2024-02-06 PROCEDURE — 80053 COMPREHEN METABOLIC PANEL: CPT

## 2024-02-06 PROCEDURE — 87483 CNS DNA AMP PROBE TYPE 12-25: CPT

## 2024-02-06 RX ORDER — CASTOR OIL AND BALSAM, PERU 788; 87 MG/G; MG/G
OINTMENT TOPICAL 2 TIMES DAILY
Status: DISCONTINUED | OUTPATIENT
Start: 2024-02-06 | End: 2024-02-09 | Stop reason: HOSPADM

## 2024-02-06 RX ORDER — POTASSIUM CHLORIDE 20 MEQ/1
40 TABLET, EXTENDED RELEASE ORAL EVERY 4 HOURS
Status: COMPLETED | OUTPATIENT
Start: 2024-02-06 | End: 2024-02-06

## 2024-02-06 RX ADMIN — VANCOMYCIN HYDROCHLORIDE 1000 MG: 1 INJECTION, POWDER, LYOPHILIZED, FOR SOLUTION INTRAVENOUS at 22:30

## 2024-02-06 RX ADMIN — PIPERACILLIN AND TAZOBACTAM 3375 MG: 3; .375 INJECTION, POWDER, FOR SOLUTION INTRAVENOUS; PARENTERAL at 10:39

## 2024-02-06 RX ADMIN — MELATONIN 3 MG: at 22:23

## 2024-02-06 RX ADMIN — Medication: at 22:23

## 2024-02-06 RX ADMIN — POTASSIUM CHLORIDE 40 MEQ: 1500 TABLET, EXTENDED RELEASE ORAL at 10:31

## 2024-02-06 RX ADMIN — PIPERACILLIN AND TAZOBACTAM 3375 MG: 3; .375 INJECTION, POWDER, FOR SOLUTION INTRAVENOUS; PARENTERAL at 17:58

## 2024-02-06 RX ADMIN — POTASSIUM CHLORIDE 40 MEQ: 1500 TABLET, EXTENDED RELEASE ORAL at 14:26

## 2024-02-06 RX ADMIN — ACYCLOVIR SODIUM 700 MG: 50 INJECTION, SOLUTION INTRAVENOUS at 14:29

## 2024-02-06 RX ADMIN — PIPERACILLIN AND TAZOBACTAM 3375 MG: 3; .375 INJECTION, POWDER, FOR SOLUTION INTRAVENOUS; PARENTERAL at 01:12

## 2024-02-06 RX ADMIN — SODIUM CHLORIDE, PRESERVATIVE FREE 10 ML: 5 INJECTION INTRAVENOUS at 22:31

## 2024-02-06 RX ADMIN — ACYCLOVIR SODIUM 700 MG: 50 INJECTION, SOLUTION INTRAVENOUS at 02:43

## 2024-02-06 RX ADMIN — Medication: at 10:42

## 2024-02-06 NOTE — CARE COORDINATION
Care management note:      Received a call from Kelly Curtis daughter to obtain update- explained referral made to [facility] and awaiting a response- will need [employer] authorization - CM sent a listing of SNF to daughter to [email or web address] to review and provide 2-3 back up options of SNF if needed.  Daughter plans for SNF to be short term and plan to return to home.  staff will follow. JILL LEWIS, MSW  [phone]

## 2024-02-06 NOTE — PROGRESS NOTES
Hospitalist Progress Note    NAME:   Baylee Martin   : 1950   MRN: [id number]     Date/Time: 2024 3:15 PM  Patient PCP: No primary care provider on file.    Estimated discharge date:>48hrs   Barriers: mentation imprvt, lp, fever workup      Assessment / Plan:    Altered mental status, rule out meningitis  Question of undiagnosed mild dementia  History of stroke on aspirin and Xarelto  CT head is negative for any acute process.  Serum alcohol negative.  Urine toxicology within normal limits.  The patient developed fever in the ED course.  Continue to monitor patient in telemetry with neurochecks every 4 hourly.  Bcx-neg   UA negcovid, flu neg  Cxr-no PNA  TSH nl  Plan LP today  EEG-no seizure  MRI pending  cont on IV Zosyn, IV vancomycin, IV acyclovir.  Neurology consult appreciated   Will cont abx/antivirals until lp and meningitis pnl return  Less likely meningitis/encephalitis given fluctuating confusion per neuro    Dc diazepam,  Can use haldol prn for agitation (qtc 441     Tachycardia  Echo grade I diastolic dysfunction  resolved     Hypokalemia  Will replte          Medical Decision Making:   I personally reviewed labs:cbc bmp  I personally reviewed imaging:  I personally reviewed EKG:  Toxic drug monitoring:   Discussed case with: pt daughter, idr, rn  neuro        Code Status: full  DVT Prophylaxis: was on xarelto at home, which is on hold, prophylatic lovenox held for lp  GI Prophylaxis:    Subjective:     Chief Complaint / Reason for Physician Visit  Patient was more alert and oriented this morning.  Daughter by the bedside, who agrees that patient's mental status has improved.       Objective:     VITALS:   Last 24hrs VS reviewed since prior progress note. Most recent are:  Patient Vitals for the past 24 hrs:   BP Temp Temp src Pulse Resp SpO2 Height Weight   24 1514 (!) 142/74 98.1 °F (36.7 °C) Oral 100 19 95 % -- --   24 1113 (!) 158/86 98.1 °F (36.7 °C) -- 81 18 97 % -- --

## 2024-02-06 NOTE — PROGRESS NOTES
undiagnosed MCI/or possible dementia.  However, agree with continuing CNS coverage meningitis including antibiotics and antivirals until LP is done.    Head CT nonacute. Generalized volume loss. White matter hypodensities may reflect chronic microangiopathic change. Chronic bilateral basal ganglia lacunar infarcts.    Brain MRI showed no evidence of acute process.There is moderate atrophy and compensatory dilatation of ventricles. There is moderate white matter disease likely to chronic small vessel ischemic disease.There is a chronic lacunar infarct in the right basal ganglia. There is a chronic appearing infarct in the right cerebellum.     EEG was abnormal due to a mild asymmetry with the right hemispheric slowing which is suggestive of focal cerebral dysfunction such as a structural insult.  Would recommend neuroimaging when patient is stable to evaluate for this.  There is no epileptiform discharges or electrographic seizures or events of clinical concern.    Lumbar puncture: Pending    RECOMMENDATIONS:  Altered mental status, resolved  - Lumbar puncture: Pending  - Continue CNS coverage meningitis including antibiotics antivirals until LP is completed.  -  If encephalitis panel for PCR is negative, can discontinue all antibiotics and antivirals.  - Continue to treat any toxic metabolic abnormality  - Continue to evaluate for infectious process  - Neurology will continue to follow patient.    Possible new underlying neurocognitive disease, MCI?  - Patient will need formal neuropsych testing in the outpatient setting    Thank you very much for this consultation.    I spent 55 minutes providing care to this acutely ill inpatient with > 50% of the time counseling as well as reviewing the patient's chart, notes, labs, medications and preparing documentation along with assisting in the coordination of care of the patient on the patient's hospital floor/unit.     KAN Davis - NP     Collaborating physician:    Dr. Xenia Bynum

## 2024-02-06 NOTE — CARE COORDINATION
Care Management Initial Assessment       RUR: 14  Readmission? No  1st IM letter given? Yes   1st  letter given: No         02/05/24 2141   Service Assessment   Patient Orientation Alert and Oriented   Cognition Alert   History Provided By Child/Family   Primary Caregiver Family  (Daughter Cyndee [phone])   Support Systems Children   Patient's Healthcare Decision Maker is: Legal Next of Kin   PCP Verified by CM Yes  (Pt is connected with [facility] for PCP care)   Last Visit to PCP Within last 3 months   Prior Functional Level Independent in ADLs/IADLs   Current Functional Level Independent in ADLs/IADLs   Can patient return to prior living arrangement Yes   Ability to make needs known: Good   Financial Resources Medicare   Social/Functional History   Lives With Daughter   Type of Home House   Home Layout One level   Home Access Stairs to enter with rails   Entrance Stairs - Number of Steps 4   Home Equipment Cane;Rollator   Receives Help From Family   ADL Assistance Independent   Homemaking Assistance Independent   Ambulation Assistance Independent   Transfer Assistance Independent   Active  No   Occupation Retired   Discharge Planning   Type of Residence Skilled Nursing Facility   Living Arrangements Children   Patient expects to be discharged to: Skilled nursing facility  ([facility]   Services At/After Discharge   Transition of Care Consult (CM Consult) SNF   Partner SNF Yes   Mode of Transport at Discharge BLS       Advance Care Planning     General Advance Care Planning (ACP) Conversation    Date of Conversation: 2/4/2024  Conducted with: Patient with Decision Making Capacity    Healthcare Decision Maker:  No healthcare decision makers have been documented.  Click here to complete HealthCare Decision Makers including selection of the Healthcare Decision Maker Relationship (ie \"Primary\")   Today we       Content/Action Overview:  DECLINED ACP Conversation - will revisit  periodically  Reviewed DNR/DNI and patient elects Full Code (Attempt Resuscitation)        Length of Voluntary ACP Conversation in minutes:  16 minutes    Prince Rudolph           CM spoke to pt's daughter and completed CM iniial assessment. Pt lives with daughter . Pt is connected with [facility] for PCP care. SNF list provided. Daughter [facility]. Floor cm contact information provided. Floor cm will send SNF referral. Pt need insurance auth for SNF admission.     Prince Rudolph MSW    Ext [phone]

## 2024-02-06 NOTE — PROGRESS NOTES
End of Shift Note    Bedside shift change report given to JEROME Douglas (oncoming nurse) by Nora Olguin RN (offgoing nurse).  Report included the following information Nurse Handoff Report      Shift worked: Days   Shift summary and any significant changes:    IV ATB administered  Lumbar puncture completed  Labs drawn    Concerns for physician to address:  None   Zone phone for oncoming shift:   [phone]     Patient Information  Baylee Martin  73 y.o.  2/4/2024 10:23 AM by Yvette Toney MD. Baylee Martin was admitted from Home    Problem List  Patient Active Problem List    Diagnosis Date Noted    Confusion 02/06/2024    Altered mental status 02/04/2024    CVA (cerebral vascular accident) (HCC) 06/20/2018    Chronic hepatitis C without hepatic coma (HCC) 03/23/2018    High cholesterol 03/23/2018     History reviewed. No pertinent past medical history.    Core Measures:  CVA: No Yes  CHF:No No  PNA:NoNo    Activity:  Activity: In bed  Number times ambulated in hallways past shift: 0  Number of times OOB to chair past shift: 0    Cardiac:   Cardiac Monitoring: Yes      Cardiac Rhythm: Sinus rhythm    Access:   Current line(s): PIV      Genitourinary:   Urinary status: voiding , external catheter      Respiratory:   O2 Device: None (Room air)  Chronic home O2 use?: no  Incentive spirometer at bedside: no       GI:  Last BM (including prior to admit): 02/04/24  Current diet:  ADULT DIET; Regular  Passing flatus: yes  Tolerating current diet: yes       Pain Management:   Patient states pain is manageable on current regimen: yes    Skin:  Boom Scale Score: 18  Interventions: float heels, increase time out of bed, and PT/OT consult    Patient Safety:  Fall Score: [place] Total Score: 75  Interventions: bed/chair alarm, assistive devices (walker, cane, etc.), gripper socks, pt to call before getting OOB, and stay with me (per policy)       DVT prophylaxis:  DVT prophylaxis Med- no  DVT prophylaxis SCD or JOY- yes     Wounds:  (If Applicable)  Wounds- no      Active Consults:  IP CONSULT TO HOSPITALIST  IP CONSULT TO NEUROLOGY  IP CONSULT TO PHARMACY    Length of Stay:  Expected LOS: 5  Actual LOS: 2  Discharge Plan: yes CHERYLE Olguin RN

## 2024-02-06 NOTE — PROGRESS NOTES
End of Shift Note    Bedside shift change report given to JEROME Melendez (oncoming nurse) by Stella Carson RN (offgoing nurse).  Report included the following information Nurse Handoff Report      Shift worked:  7p - 7a   Shift summary and any significant changes:    No acute changes     Concerns for physician to address:  None   Zone phone for oncoming shift:   [phone]     Patient Information  Baylee Martin  73 y.o.  2/4/2024 10:23 AM by Yvette Toney MD. Baylee Martin was admitted from Home    Problem List  Patient Active Problem List    Diagnosis Date Noted    Altered mental status 02/04/2024    CVA (cerebral vascular accident) (HCC) 06/20/2018    Chronic hepatitis C without hepatic coma (HCC) 03/23/2018    High cholesterol 03/23/2018     History reviewed. No pertinent past medical history.    Core Measures:  CVA: No Yes  CHF:No No  PNA:NoNo    Activity:  Activity: In bed  Number times ambulated in hallways past shift: 0  Number of times OOB to chair past shift: 0    Cardiac:   Cardiac Monitoring: Yes      Cardiac Rhythm: Sinus rhythm    Access:   Current line(s): PIV      Genitourinary:   Urinary status: voiding , external catheter      Respiratory:   O2 Device: None (Room air)  Chronic home O2 use?: no  Incentive spirometer at bedside: no       GI:  Last BM (including prior to admit): 02/04/24  Current diet:  ADULT DIET; Regular  Passing flatus: yes  Tolerating current diet: yes       Pain Management:   Patient states pain is manageable on current regimen: yes    Skin:  Boom Scale Score: 18  Interventions: float heels, increase time out of bed, and PT/OT consult    Patient Safety:  Fall Score: [place] Total Score: 75  Interventions: bed/chair alarm, assistive devices (walker, cane, etc.), gripper socks, pt to call before getting OOB, and stay with me (per policy)       DVT prophylaxis:  DVT prophylaxis Med- no  DVT prophylaxis SCD or JOY- yes     Wounds: (If Applicable)  Wounds- no      Active Consults:  IP CONSULT

## 2024-02-06 NOTE — PROGRESS NOTES
Pharmacy Antimicrobial Kinetic Dosing    Indication for Antimicrobials: Spesi, AMS, rule out meningitis (on Rivaroxaban, so no LP)    Current Regimen of Each Antimicrobial:  Vancomycin pharmacy to dose; Start Date  ; Day # 2   Zosyn 4.5 gm IV x1 then 3.375 gm IV q 8h; Start Date  ; Day # 2     Previous Antimicrobial Therapy:  Acyclovir IV    Goal Level: Vancomycin -600    Date Dose & Interval Measured (mcg/mL) Predicted AUC                       Significant Cultures:     Blood = NG    Labs:  Recent Labs     Units 24  0531 24  1108   CREATININE MG/DL 1.17* 1.24*   BUN MG/DL 14 23*   WBC K/uL 13.9* 10.9     Temp (24hrs), Av.5 °F (37.5 °C), Min:98.2 °F (36.8 °C), Max:101.8 °F (38.8 °C)      Conditions for Dosing Consideration: None    Creatinine Clearance (mL/min): Estimated Creatinine Clearance: 39 mL/min (A) (based on SCr of 1.17 mg/dL (H)).       Impression/Plan:   D/C acyclovir per neurology  Level is subtherapeutic resulted 8 mg/L  Will change Vancomycin with 1000 mg IV q 24h for AUC of 448.   Continue Piperacillin-Tazobactam   Antimicrobial stop date:  Vancomycin = 7 days     Pharmacy will follow daily and adjust medications as appropriate for renal function and/or serum levels.    Thank you,  Salas Hernández [facility]

## 2024-02-07 ENCOUNTER — APPOINTMENT (OUTPATIENT)
Facility: HOSPITAL | Age: 74
DRG: 871 | End: 2024-02-07
Payer: MEDICARE

## 2024-02-07 LAB
ANION GAP SERPL CALC-SCNC: 5 MMOL/L (ref 5–15)
BACTERIA SPEC CULT: NORMAL
BASOPHILS # BLD: 0.1 K/UL (ref 0–0.1)
BASOPHILS NFR BLD: 1 % (ref 0–1)
BUN SERPL-MCNC: 15 MG/DL (ref 6–20)
BUN/CREAT SERPL: 14 (ref 12–20)
CALCIUM SERPL-MCNC: 8.5 MG/DL (ref 8.5–10.1)
CHLORIDE SERPL-SCNC: 114 MMOL/L (ref 97–108)
CO2 SERPL-SCNC: 24 MMOL/L (ref 21–32)
CREAT SERPL-MCNC: 1.07 MG/DL (ref 0.55–1.02)
DIFFERENTIAL METHOD BLD: ABNORMAL
EOSINOPHIL # BLD: 0.4 K/UL (ref 0–0.4)
EOSINOPHIL NFR BLD: 3 % (ref 0–7)
ERYTHROCYTE [DISTWIDTH] IN BLOOD BY AUTOMATED COUNT: 13.9 % (ref 11.5–14.5)
GLUCOSE SERPL-MCNC: 113 MG/DL (ref 65–100)
GRAM STN SPEC: NORMAL
GRAM STN SPEC: NORMAL
HCT VFR BLD AUTO: 34.6 % (ref 35–47)
HGB BLD-MCNC: 11 G/DL (ref 11.5–16)
IMM GRANULOCYTES # BLD AUTO: 0.1 K/UL (ref 0–0.04)
IMM GRANULOCYTES NFR BLD AUTO: 0 % (ref 0–0.5)
LYMPHOCYTES # BLD: 4 K/UL (ref 0.8–3.5)
LYMPHOCYTES NFR BLD: 32 % (ref 12–49)
MCH RBC QN AUTO: 26.6 PG (ref 26–34)
MCHC RBC AUTO-ENTMCNC: 31.8 G/DL (ref 30–36.5)
MCV RBC AUTO: 83.6 FL (ref 80–99)
MONOCYTES # BLD: 1.1 K/UL (ref 0–1)
MONOCYTES NFR BLD: 9 % (ref 5–13)
NEUTS SEG # BLD: 7 K/UL (ref 1.8–8)
NEUTS SEG NFR BLD: 55 % (ref 32–75)
NRBC # BLD: 0 K/UL (ref 0–0.01)
NRBC BLD-RTO: 0 PER 100 WBC
PLATELET # BLD AUTO: 192 K/UL (ref 150–400)
PMV BLD AUTO: 11.2 FL (ref 8.9–12.9)
POTASSIUM SERPL-SCNC: 3.9 MMOL/L (ref 3.5–5.1)
RBC # BLD AUTO: 4.14 M/UL (ref 3.8–5.2)
REAGIN AB CSF QL: NON REACTIVE
SERVICE CMNT-IMP: NORMAL
SODIUM SERPL-SCNC: 143 MMOL/L (ref 136–145)
WBC # BLD AUTO: 12.6 K/UL (ref 3.6–11)

## 2024-02-07 PROCEDURE — 6370000000 HC RX 637 (ALT 250 FOR IP): Performed by: INTERNAL MEDICINE

## 2024-02-07 PROCEDURE — 6360000004 HC RX CONTRAST MEDICATION

## 2024-02-07 PROCEDURE — 71260 CT THORAX DX C+: CPT

## 2024-02-07 PROCEDURE — 85025 COMPLETE CBC W/AUTO DIFF WBC: CPT

## 2024-02-07 PROCEDURE — 97535 SELF CARE MNGMENT TRAINING: CPT

## 2024-02-07 PROCEDURE — 80048 BASIC METABOLIC PNL TOTAL CA: CPT

## 2024-02-07 PROCEDURE — 99233 SBSQ HOSP IP/OBS HIGH 50: CPT

## 2024-02-07 PROCEDURE — 76705 ECHO EXAM OF ABDOMEN: CPT

## 2024-02-07 PROCEDURE — 1100000000 HC RM PRIVATE

## 2024-02-07 PROCEDURE — 36415 COLL VENOUS BLD VENIPUNCTURE: CPT

## 2024-02-07 PROCEDURE — 97530 THERAPEUTIC ACTIVITIES: CPT

## 2024-02-07 PROCEDURE — 6360000002 HC RX W HCPCS: Performed by: INTERNAL MEDICINE

## 2024-02-07 PROCEDURE — 97116 GAIT TRAINING THERAPY: CPT

## 2024-02-07 PROCEDURE — 2580000003 HC RX 258: Performed by: INTERNAL MEDICINE

## 2024-02-07 RX ORDER — CLOPIDOGREL BISULFATE 75 MG/1
75 TABLET ORAL DAILY
COMMUNITY
Start: 2018-02-05

## 2024-02-07 RX ORDER — VELPATASVIR AND SOFOSBUVIR 100; 400 MG/1; MG/1
1 TABLET, FILM COATED ORAL DAILY
Status: DISCONTINUED | OUTPATIENT
Start: 2024-02-08 | End: 2024-02-09

## 2024-02-07 RX ORDER — FLUTICASONE PROPIONATE 50 MCG
1 SPRAY, SUSPENSION (ML) NASAL DAILY
COMMUNITY
Start: 2024-01-03

## 2024-02-07 RX ORDER — ASPIRIN 81 MG/1
81 TABLET, CHEWABLE ORAL DAILY
Status: DISCONTINUED | OUTPATIENT
Start: 2024-02-07 | End: 2024-02-09 | Stop reason: HOSPADM

## 2024-02-07 RX ORDER — TRAZODONE HYDROCHLORIDE 50 MG/1
50 TABLET ORAL NIGHTLY
Status: DISCONTINUED | OUTPATIENT
Start: 2024-02-07 | End: 2024-02-09 | Stop reason: HOSPADM

## 2024-02-07 RX ORDER — TRAZODONE HYDROCHLORIDE 50 MG/1
50 TABLET ORAL NIGHTLY
COMMUNITY
Start: 2023-11-30

## 2024-02-07 RX ORDER — RIVAROXABAN 2.5 MG/1
2.5 TABLET, FILM COATED ORAL 2 TIMES DAILY
COMMUNITY
Start: 2024-01-16

## 2024-02-07 RX ORDER — VELPATASVIR AND SOFOSBUVIR 100; 400 MG/1; MG/1
1 TABLET, FILM COATED ORAL DAILY
Status: ON HOLD | COMMUNITY
Start: 2018-05-02 | End: 2024-02-09 | Stop reason: HOSPADM

## 2024-02-07 RX ORDER — FLUTICASONE PROPIONATE 50 MCG
1 SPRAY, SUSPENSION (ML) NASAL DAILY
Status: DISCONTINUED | OUTPATIENT
Start: 2024-02-08 | End: 2024-02-09 | Stop reason: HOSPADM

## 2024-02-07 RX ORDER — ATORVASTATIN CALCIUM 40 MG/1
40 TABLET, FILM COATED ORAL DAILY
COMMUNITY
Start: 2018-02-05

## 2024-02-07 RX ORDER — ATORVASTATIN CALCIUM 40 MG/1
40 TABLET, FILM COATED ORAL DAILY
Status: DISCONTINUED | OUTPATIENT
Start: 2024-02-07 | End: 2024-02-09 | Stop reason: HOSPADM

## 2024-02-07 RX ADMIN — MELATONIN 3 MG: at 20:40

## 2024-02-07 RX ADMIN — TRAZODONE HYDROCHLORIDE 50 MG: 50 TABLET ORAL at 20:39

## 2024-02-07 RX ADMIN — Medication: at 09:07

## 2024-02-07 RX ADMIN — SODIUM CHLORIDE, PRESERVATIVE FREE 10 ML: 5 INJECTION INTRAVENOUS at 16:39

## 2024-02-07 RX ADMIN — VANCOMYCIN HYDROCHLORIDE 1250 MG: 10 INJECTION, POWDER, LYOPHILIZED, FOR SOLUTION INTRAVENOUS at 12:24

## 2024-02-07 RX ADMIN — RIVAROXABAN 2.5 MG: 2.5 TABLET, FILM COATED ORAL at 16:33

## 2024-02-07 RX ADMIN — RIVAROXABAN 2.5 MG: 2.5 TABLET, FILM COATED ORAL at 20:41

## 2024-02-07 RX ADMIN — PIPERACILLIN AND TAZOBACTAM 3375 MG: 3; .375 INJECTION, POWDER, FOR SOLUTION INTRAVENOUS; PARENTERAL at 09:07

## 2024-02-07 RX ADMIN — SODIUM CHLORIDE, PRESERVATIVE FREE 10 ML: 5 INJECTION INTRAVENOUS at 21:30

## 2024-02-07 RX ADMIN — Medication: at 20:43

## 2024-02-07 RX ADMIN — ATORVASTATIN CALCIUM 40 MG: 40 TABLET, FILM COATED ORAL at 16:33

## 2024-02-07 RX ADMIN — ASPIRIN 81 MG: 81 TABLET, CHEWABLE ORAL at 16:33

## 2024-02-07 RX ADMIN — PIPERACILLIN AND TAZOBACTAM 3375 MG: 3; .375 INJECTION, POWDER, FOR SOLUTION INTRAVENOUS; PARENTERAL at 16:39

## 2024-02-07 RX ADMIN — ACYCLOVIR SODIUM 700 MG: 50 INJECTION, SOLUTION INTRAVENOUS at 03:34

## 2024-02-07 RX ADMIN — PIPERACILLIN AND TAZOBACTAM 3375 MG: 3; .375 INJECTION, POWDER, FOR SOLUTION INTRAVENOUS; PARENTERAL at 05:07

## 2024-02-07 RX ADMIN — SODIUM CHLORIDE, PRESERVATIVE FREE 10 ML: 5 INJECTION INTRAVENOUS at 09:08

## 2024-02-07 RX ADMIN — IOPAMIDOL 100 ML: 755 INJECTION, SOLUTION INTRAVENOUS at 10:36

## 2024-02-07 NOTE — PROGRESS NOTES
Hospitalist Progress Note    NAME:   Baylee Martin   : 1950   MRN: [id number]     Date/Time: 2024 2:11 PM  Patient PCP: No primary care provider on file.    Estimated discharge date:24  Barriers: fever workup      Assessment / Plan:  sepsis  Altered mental status,meningitis ruled out  Question of undiagnosed mild dementia  History of stroke on aspirin and Xarelto  Source of sepsis still not clear.   CT head is negative for any acute process.  Serum alcohol negative.  Urine toxicology within normal limits.  The patient developed fever in the ED course.  Continue to monitor patient in telemetry with neurochecks every 4 hourly.  Bcx-neg   UA negcovid, flu neg  Cxr-no PNA  TSH nl  Plan LP today  EEG-no seizure  MRI negative for any acute findings  cont on IV Zosyn, IV vancomycin,   Discontinue IV acyclovir.  Neurology consult appreciated  CT chest, abdomen and pelvis- trace pericholecystic fluid  -will get US abdomen  Can use haldol prn for agitation (qtc 441     Tachycardia  Echo grade I diastolic dysfunction  resolved     Hypokalemia  resolved          Medical Decision Making:   I personally reviewed labs:cbc bmp  I personally reviewed imaging: CT chest, abdomen and pelvis  I personally reviewed EKG:  Toxic drug monitoring:   Discussed case with: pt, RN, CM      Code Status: full  DVT Prophylaxis: was on xarelto at home- resume home dose of xarelto  GI Prophylaxis:    Subjective:     Chief Complaint / Reason for Physician Visit  Patient reports that iv got infiltrated while she was getting CT scan. Reports burning at the site.   Afebrile for last 24 hours  Mental status improved      Objective:     VITALS:   Last 24hrs VS reviewed since prior progress note. Most recent are:  Patient Vitals for the past 24 hrs:   BP Temp Temp src Pulse Resp SpO2   24 0800 (!) 142/82 97.3 °F (36.3 °C) -- 90 18 100 %   24 0307 (!) 165/78 98.4 °F (36.9 °C) Oral 80 16 97 %   24 2345 (!) 142/73 98.1 °F
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
## 2024-02-07 NOTE — PROGRESS NOTES
End of Shift Note    Bedside shift change report given to Nisha RN (oncoming nurse) by Stella Carson RN (offgoing nurse).  Report included the following information Nurse Handoff Report      Shift worked: 7p - 7a   Shift summary and any significant changes:    No acute changes.  Labs leena.    Concerns for physician to address:  None   Zone phone for oncoming shift:   [phone]     Patient Information  Baylee Martin  73 y.o.  2/4/2024 10:23 AM by Yvette Toney MD. Baylee Martin was admitted from Home    Problem List  Patient Active Problem List    Diagnosis Date Noted    Confusion 02/06/2024    Altered mental status 02/04/2024    CVA (cerebral vascular accident) (HCC) 06/20/2018    Chronic hepatitis C without hepatic coma (HCC) 03/23/2018    High cholesterol 03/23/2018     History reviewed. No pertinent past medical history.    Core Measures:  CVA: No Yes  CHF:No No  PNA:NoNo    Activity:  Activity: In bed  Number times ambulated in hallways past shift: 0  Number of times OOB to chair past shift: 0    Cardiac:   Cardiac Monitoring: Yes      Cardiac Rhythm: Sinus rhythm    Access:   Current line(s): PIV      Genitourinary:   Urinary status: voiding , external catheter      Respiratory:   O2 Device: None (Room air)  Chronic home O2 use?: no  Incentive spirometer at bedside: no       GI:  Last BM (including prior to admit): 02/04/24  Current diet:  ADULT DIET; Regular  Passing flatus: yes  Tolerating current diet: yes       Pain Management:   Patient states pain is manageable on current regimen: yes    Skin:  Boom Scale Score: 18  Interventions: float heels, increase time out of bed, and PT/OT consult    Patient Safety:  Fall Score: Grullon Total Score: 60  Interventions: bed/chair alarm, assistive devices (walker, cane, etc.), gripper socks, pt to call before getting OOB, and stay with me (per policy)       DVT prophylaxis:  DVT prophylaxis Med- no  DVT prophylaxis SCD or JOY- yes     Wounds: (If Applicable)  Wounds-

## 2024-02-07 NOTE — PLAN OF CARE
Problem: Physical Therapy - Adult  Goal: By Discharge: Performs mobility at highest level of function for planned discharge setting.  See evaluation for individualized goals.  Description: FUNCTIONAL STATUS PRIOR TO ADMISSION: Patient was modified independent using a rollator for functional mobility.    HOME SUPPORT PRIOR TO ADMISSION: The patient lived with daughter.    Physical Therapy Goals  Initiated 2/5/2024  1.  Patient will move from supine to sit and sit to supine in bed with contact guard assist within 7 day(s).    2.  Patient will perform sit to stand with minimal assistance within 7 day(s).  3.  Patient will transfer from bed to chair and chair to bed with minimal assistance using the least restrictive device within 7 day(s).  4.  Patient will ambulate with minimal assistance for 15 feet with the least restrictive device within 7 day(s).       Outcome: Progressing   PHYSICAL THERAPY TREATMENT    Patient: Baylee Martin (73 y.o. female)  Date: 2/7/2024  Diagnosis: Confusion [R41.0]  Altered mental status [R41.82]  Tachycardia [R00.0]  Disorientation [R41.0] Altered mental status      Precautions: Fall Risk, Bed Alarm                      ASSESSMENT:  Patient continues to benefit from skilled PT services and is slowly progressing towards goals. Pt was received in supine and cleared by nursing to mobilize. She is much clearer in mentation today. Able to mobilize EOB without assistance and minimal cues. Stood with RW and took a few steps to the chair with slowed pace. She fatigued quickly but was able to ambulate to the sink and stood for ~ 1 minutes while performing ADLs. She needed a chair pulled up behind for her to sit and rest. Tolerated short distance ambulation back to the chair.          PLAN:  Patient continues to benefit from skilled intervention to address the above impairments.  Continue treatment per established plan of care.    Recommendation for discharge: (in order for the patient to meet

## 2024-02-07 NOTE — PROGRESS NOTES
End of Shift Note    Bedside shift change report given to JEROME Douglas (oncoming nurse) by KI PEDRO RN (offgoing nurse).  Report included the following information Nurse Handoff Report      Shift worked: 7a- 7p   Shift summary and any significant changes:    CT abdomen and ultrasound of abdomen   Concerns for physician to address:  None   Zone phone for oncoming shift:   [phone]     Patient Information  Baylee Martin  73 y.o.  2/4/2024 10:23 AM by Yvette Toney MD. Baylee Martin was admitted from Home    Problem List  Patient Active Problem List    Diagnosis Date Noted    Confusion 02/06/2024    Altered mental status 02/04/2024    CVA (cerebral vascular accident) (HCC) 06/20/2018    Chronic hepatitis C without hepatic coma (HCC) 03/23/2018    High cholesterol 03/23/2018     History reviewed. No pertinent past medical history.    Core Measures:  CVA: No Yes  CHF:No No  PNA:NoNo    Activity:  Activity: In bed  Number times ambulated in hallways past shift: 0  Number of times OOB to chair past shift: 0    Cardiac:   Cardiac Monitoring: Yes      Cardiac Rhythm: Sinus rhythm    Access:   Current line(s): PIV      Genitourinary:   Urinary status: voiding , external catheter      Respiratory:   O2 Device: None (Room air)  Chronic home O2 use?: no  Incentive spirometer at bedside: no       GI:  Last BM (including prior to admit): 02/04/24  Current diet:  ADULT DIET; Regular  Passing flatus: yes  Tolerating current diet: yes       Pain Management:   Patient states pain is manageable on current regimen: yes    Skin:  Boom Scale Score: 18  Interventions: float heels, increase time out of bed, and PT/OT consult    Patient Safety:  Fall Score: Grullon Total Score: 60  Interventions: bed/chair alarm, assistive devices (walker, cane, etc.), gripper socks, pt to call before getting OOB, and stay with me (per policy)       DVT prophylaxis:  DVT prophylaxis Med- no  DVT prophylaxis SCD or JOY- yes     Wounds: (If  Applicable)  Wounds- no      Active Consults:  IP CONSULT TO HOSPITALIST  IP CONSULT TO NEUROLOGY  IP CONSULT TO PHARMACY    Length of Stay:  Expected LOS: 5  Actual LOS: 3  Discharge Plan: yes TBD      KI PEDRO RN

## 2024-02-07 NOTE — PLAN OF CARE
Problem: Occupational Therapy - Adult  Goal: By Discharge: Performs self-care activities at highest level of function for planned discharge setting.  See evaluation for individualized goals.  Description: FUNCTIONAL STATUS PRIOR TO ADMISSION:  ambulated with SPC in the home and rollator walker in the community, has baseline LLE/UE weakness due to old CVA, pt needed assist to step over tub and supervision to assist for bathing seated on shower chair, pts daughter reports pt is fearful of stepping over tub, performed all other ADLS on her own and family performs IADLS   ,  ,  ,  ,  ,  ,  ,  ,  ,  ,       HOME SUPPORT: Patient lived with daughter whom works in a SNF.    Occupational Therapy Goals:  Initiated 2/5/2024  1.  Patient will perform grooming with Supervision seated within 7 day(s).  2.  Patient will perform upper body dressing with Supervision within 7 day(s).  3.  Patient will perform lower body dressing with Moderate Assist within 7 day(s).  4.  Patient will perform toilet transfers with Minimal Assist  within 7 day(s).  5.  Patient will perform all aspects of toileting with Moderate Assist within 7 day(s).      Outcome: Progressing   OCCUPATIONAL THERAPY TREATMENT  Patient: Baylee Martin (73 y.o. female)  Date: 2/7/2024  Primary Diagnosis: Confusion [R41.0]  Altered mental status [R41.82]  Tachycardia [R00.0]  Disorientation [R41.0]       Precautions: Fall Risk, Bed Alarm                Chart, occupational therapy assessment, plan of care, and goals were reviewed.    ASSESSMENT  Patient continues to benefit from skilled OT services and is progressing towards goals. Patient received semisupine in bed and agreeable for therapy. She demonstrated significantly improved cognition compared to last session and followed all commands without difficulty. Patient completed supine > sit with contact guard assist and demonstrated intact sitting balance while donning slippers. Cues provided for safety awareness. She

## 2024-02-07 NOTE — ADT AUTH CERT
answering somequestions, daughter bedside. Per rn very forgetful and asking repetitively and pulling line\".  Discussed with RN events overnight.         Objective:      VITALS:   Last 24hrs VS reviewed since prior progress note. Most recent are:  Patient Vitals for the past 24 hrs:    BP Temp Temp src Pulse Resp SpO2   02/05/24 1131 (!) 163/76 99.5 °F (37.5 °C) -- (!) 103 18 95 %   02/05/24 0754 128/69 98.2 °F (36.8 °C) Oral 58 16 97 %   02/05/24 0517 115/67 98.8 °F (37.1 °C) -- 72 16 90 %   02/05/24 0014 (!) 144/75 99 °F (37.2 °C) Axillary 82 20 100 %   02/04/24 2009 (!) 108/92 99.5 °F (37.5 °C) Axillary (!) 112 18 98 %   02/04/24 1812 -- -- -- (!) 114 22 97 %   02/04/24 1800 (!) 152/99 (!) 102.8 °F (39.3 °C) Rectal (!) 114 23 97 %   02/04/24 1730 (!) 157/93 -- -- (!) 116 22 96 %   02/04/24 1708 (!) 156/93 -- -- (!) 121 22 95 %   02/04/24 1630 (!) 174/102 -- -- (!) 118 21 95 %   02/04/24 1600 (!) 186/103 (!) 101 °F (38.3 °C) Oral (!) 119 20 95 %   02/04/24 1550 (!) 176/109 -- -- (!) 120 24 --   02/04/24 1500 (!) 160/74 -- -- (!) 126 21 --            Intake/Output Summary (Last 24 hours) at 2/5/2024 1441  Last data filed at 2/5/2024 0540      Gross per 24 hour   Intake 1005 ml   Output 400 ml   Net 605 ml         I had a face to face encounter and independently examined this patient on 2/5/2024, as outlined below:  PHYSICAL EXAM:  General:          Coperative, confused  EENT:              EOMI. Anicteric sclerae.  Resp:               CTA bilaterally, no wheezing or rales.  No accessory muscle use  CV:                  Regular  rhythm,  No edema  GI:                   Soft, Non distended, Non tender.  +Bowel sounds  Neurologic:       confused  Psych:    Not anxious nor agitated  Skin:                No rashes.  No jaundice     Reviewed most current lab test results and cultures  YES  Reviewed most current radiology test results   YES  Review and summation of old records today    NO  Reviewed patient's current orders  acute process.  ,     CAT Scan Result (most recent):  CT HEAD WO CONTRAST 02/04/2024     Narrative  EXAM: CT HEAD WO CONTRAST     INDICATION: acute delirium, agitation, confusion     COMPARISON: None.     CONTRAST: None.     TECHNIQUE: Unenhanced CT of the head was performed using 5 mm images. Brain and  bone windows were generated. Coronal and sagittal reformats. CT dose reduction  was achieved through use of a standardized protocol tailored for this  examination and automatic exposure control for dose modulation.     FINDINGS:  Generalized volume loss. White matter hypodensities may reflect chronic  microangiopathic change. There is no intracranial hemorrhage, extra-axial  collection, or mass effect. The basilar cisterns are open. No CT evidence of  acute infarct. Chronic bilateral basal ganglia lacunar infarcts.     The bone windows demonstrate no abnormalities. The visualized portions of the  paranasal sinuses and mastoid air cells are clear.     Impression  No acute abnormality.        CT ABDOMEN PELVIS W IV CONTRAST            LAB DATA REVIEWED:                Admission on 02/04/2024   Component Date Value Ref Range Status     Sodium 02/04/2024 143  136 - 145 mmol/L Final    Potassium 02/04/2024 3.4 (L)  3.5 - 5.1 mmol/L Final    Chloride 02/04/2024 111 (H)  97 - 108 mmol/L Final    CO2 02/04/2024 26  21 - 32 mmol/L Final    Anion Gap 02/04/2024 6  5 - 15 mmol/L Final    Glucose 02/04/2024 104 (H)  65 - 100 mg/dL Final    BUN 02/04/2024 23 (H)  6 - 20 MG/DL Final    Creatinine 02/04/2024 1.24 (H)  0.55 - 1.02 MG/DL Final    Bun/Cre Ratio 02/04/2024 19  12 - 20   Final    Est, Glom Filt Rate 02/04/2024 46 (L)  >60 ml/min/1.73m2 Final     Comment:    Pediatric calculator link: https://www.kidney.org/professionals/kdoqi/gfr_calculatorped     These results are not intended for use in patients <18 years of age.     eGFR results are calculated without a race factor using  the 2021 CKD-EPI equation. Careful clinical

## 2024-02-07 NOTE — CARE COORDINATION
Transition of Care Plan:    RUR: 12% (low RUR)  Prior Level of Functioning: Needing some assistance  Disposition: SNF  If SNF or IPR: Date FOC offered: 02/06/24  Date FOC received: 02/07/24  Accepting facility: Referrals pending  Date authorization started with reference number: Will need [employer] auth   Date authorization received and expires: Pending auth start   Follow up appointments: defer to SNF  DME needed: defer to SNF  Transportation at discharge: BLS likely needed  IM/IMM Medicare/ letter given: 2nd needed prior to d/c  Is patient a [place] and connected with [facility]? N/a   If yes, was  transfer form completed and VA notified? N/a  Caregiver Contact: Luciano Curtis; VALR; [phone]  Discharge Caregiver contacted prior to discharge? CM to contact  Care Conference needed? Not at this time  Barriers to discharge: Placement, auth       0857 - Received note from previous CM that email with SNF choices was not delivered. This CM noted that pt's DTRs name was spelled incorrectly in email address. CM forwarded email to [email or web address]. CM will follow-up with DTR by phone.     5813 - CM contacted DTR for additional choices. DTR requesting 1) [facility] 2) [facility] 3) [facility] 4) [facility] 5) [facility] 6) [facility]. CM contacted AC for updated, left message with  requesting call back. Additional referrals placed.     OLGA Newton  Care Management

## 2024-02-08 LAB
ANION GAP SERPL CALC-SCNC: 5 MMOL/L (ref 5–15)
BASOPHILS # BLD: 0.1 K/UL (ref 0–0.1)
BASOPHILS NFR BLD: 1 % (ref 0–1)
BUN SERPL-MCNC: 15 MG/DL (ref 6–20)
BUN/CREAT SERPL: 13 (ref 12–20)
CALCIUM SERPL-MCNC: 8.5 MG/DL (ref 8.5–10.1)
CHLORIDE SERPL-SCNC: 109 MMOL/L (ref 97–108)
CO2 SERPL-SCNC: 25 MMOL/L (ref 21–32)
COMMENT:: NORMAL
CREAT SERPL-MCNC: 1.13 MG/DL (ref 0.55–1.02)
DIFFERENTIAL METHOD BLD: ABNORMAL
EOSINOPHIL # BLD: 0.4 K/UL (ref 0–0.4)
EOSINOPHIL NFR BLD: 4 % (ref 0–7)
ERYTHROCYTE [DISTWIDTH] IN BLOOD BY AUTOMATED COUNT: 14 % (ref 11.5–14.5)
GLUCOSE SERPL-MCNC: 122 MG/DL (ref 65–100)
HCT VFR BLD AUTO: 34.4 % (ref 35–47)
HGB BLD-MCNC: 10.8 G/DL (ref 11.5–16)
HSV 1 DNA: NEGATIVE
HSV 2 DNA: NEGATIVE
IMM GRANULOCYTES # BLD AUTO: 0 K/UL (ref 0–0.04)
IMM GRANULOCYTES NFR BLD AUTO: 0 % (ref 0–0.5)
LYMPHOCYTES # BLD: 3.9 K/UL (ref 0.8–3.5)
LYMPHOCYTES NFR BLD: 34 % (ref 12–49)
MCH RBC QN AUTO: 26.4 PG (ref 26–34)
MCHC RBC AUTO-ENTMCNC: 31.4 G/DL (ref 30–36.5)
MCV RBC AUTO: 84.1 FL (ref 80–99)
MONOCYTES # BLD: 1 K/UL (ref 0–1)
MONOCYTES NFR BLD: 9 % (ref 5–13)
NEUTS SEG # BLD: 6.1 K/UL (ref 1.8–8)
NEUTS SEG NFR BLD: 52 % (ref 32–75)
NRBC # BLD: 0 K/UL (ref 0–0.01)
NRBC BLD-RTO: 0 PER 100 WBC
PLATELET # BLD AUTO: 186 K/UL (ref 150–400)
PMV BLD AUTO: 11.9 FL (ref 8.9–12.9)
POTASSIUM SERPL-SCNC: 3.3 MMOL/L (ref 3.5–5.1)
RBC # BLD AUTO: 4.09 M/UL (ref 3.8–5.2)
SODIUM SERPL-SCNC: 139 MMOL/L (ref 136–145)
SPECIMEN HOLD: NORMAL
WBC # BLD AUTO: 11.6 K/UL (ref 3.6–11)
WNV IGG SPEC QL IA: POSITIVE
WNV IGM CSF QL IA: NEGATIVE

## 2024-02-08 PROCEDURE — 2580000003 HC RX 258: Performed by: INTERNAL MEDICINE

## 2024-02-08 PROCEDURE — 1100000000 HC RM PRIVATE

## 2024-02-08 PROCEDURE — 97535 SELF CARE MNGMENT TRAINING: CPT

## 2024-02-08 PROCEDURE — 76937 US GUIDE VASCULAR ACCESS: CPT

## 2024-02-08 PROCEDURE — 99223 1ST HOSP IP/OBS HIGH 75: CPT | Performed by: INTERNAL MEDICINE

## 2024-02-08 PROCEDURE — 6370000000 HC RX 637 (ALT 250 FOR IP): Performed by: INTERNAL MEDICINE

## 2024-02-08 PROCEDURE — 36415 COLL VENOUS BLD VENIPUNCTURE: CPT

## 2024-02-08 PROCEDURE — 85025 COMPLETE CBC W/AUTO DIFF WBC: CPT

## 2024-02-08 PROCEDURE — 6360000002 HC RX W HCPCS: Performed by: INTERNAL MEDICINE

## 2024-02-08 PROCEDURE — 97530 THERAPEUTIC ACTIVITIES: CPT

## 2024-02-08 PROCEDURE — 80048 BASIC METABOLIC PNL TOTAL CA: CPT

## 2024-02-08 PROCEDURE — 97116 GAIT TRAINING THERAPY: CPT

## 2024-02-08 RX ORDER — POTASSIUM CHLORIDE 20 MEQ/1
40 TABLET, EXTENDED RELEASE ORAL ONCE
Status: COMPLETED | OUTPATIENT
Start: 2024-02-08 | End: 2024-02-08

## 2024-02-08 RX ADMIN — PIPERACILLIN AND TAZOBACTAM 3375 MG: 3; .375 INJECTION, POWDER, FOR SOLUTION INTRAVENOUS; PARENTERAL at 15:16

## 2024-02-08 RX ADMIN — FLUTICASONE PROPIONATE 1 SPRAY: 50 SPRAY, METERED NASAL at 08:55

## 2024-02-08 RX ADMIN — SODIUM CHLORIDE: 9 INJECTION, SOLUTION INTRAVENOUS at 15:13

## 2024-02-08 RX ADMIN — PIPERACILLIN AND TAZOBACTAM 3375 MG: 3; .375 INJECTION, POWDER, FOR SOLUTION INTRAVENOUS; PARENTERAL at 23:28

## 2024-02-08 RX ADMIN — Medication: at 20:42

## 2024-02-08 RX ADMIN — RIVAROXABAN 2.5 MG: 2.5 TABLET, FILM COATED ORAL at 20:40

## 2024-02-08 RX ADMIN — POTASSIUM CHLORIDE 40 MEQ: 1500 TABLET, EXTENDED RELEASE ORAL at 08:57

## 2024-02-08 RX ADMIN — SODIUM CHLORIDE, PRESERVATIVE FREE 10 ML: 5 INJECTION INTRAVENOUS at 20:42

## 2024-02-08 RX ADMIN — MELATONIN 3 MG: at 20:40

## 2024-02-08 RX ADMIN — RIVAROXABAN 2.5 MG: 2.5 TABLET, FILM COATED ORAL at 08:56

## 2024-02-08 RX ADMIN — Medication: at 09:01

## 2024-02-08 RX ADMIN — TRAZODONE HYDROCHLORIDE 50 MG: 50 TABLET ORAL at 20:40

## 2024-02-08 RX ADMIN — ASPIRIN 81 MG: 81 TABLET, CHEWABLE ORAL at 08:56

## 2024-02-08 RX ADMIN — SODIUM CHLORIDE: 9 INJECTION, SOLUTION INTRAVENOUS at 23:27

## 2024-02-08 RX ADMIN — SODIUM CHLORIDE, PRESERVATIVE FREE 10 ML: 5 INJECTION INTRAVENOUS at 09:01

## 2024-02-08 RX ADMIN — PIPERACILLIN AND TAZOBACTAM 3375 MG: 3; .375 INJECTION, POWDER, FOR SOLUTION INTRAVENOUS; PARENTERAL at 00:48

## 2024-02-08 RX ADMIN — ATORVASTATIN CALCIUM 40 MG: 40 TABLET, FILM COATED ORAL at 09:00

## 2024-02-08 NOTE — CARE COORDINATION
Transition of Care Plan:     RUR: 12% (low RUR)  Prior Level of Functioning: Needing some assistance  Disposition: SNF  If SNF or IPR: Date FOC offered: 02/06/24  Date FOC received: 02/07/24  Accepting facility: [facility]  Date authorization started with reference number: Rodney auth started; reference [id number]  Date authorization received and expires: Auth approved 02/08-02/12  Follow up appointments: defer to SNF  DME needed: defer to SNF  Transportation at discharge: BLS needed per family   IM/IMM Medicare/ letter given: 2nd needed prior to d/c  Is patient a  and connected with VA? N/a              If yes, was  transfer form completed and VA notified? N/a  Caregiver Contact: Luciano Curtis; DTR; [phone]  Discharge Caregiver contacted prior to discharge? CM to contact  Care Conference needed? Not at this time  Barriers to discharge: Bed availability     0859 - [facility] accepted, family is agreeable to this. CM started auth via [employer]. Reference # [id number], CMA to fax clinicals to [phone]. CM continuing to follow.     1355 - Discussed plan for rehab with pt, she is agreeable at this time. Pt requested CM place referral for transport chair. CM made pt aware that referral can be placed but CM could not guarantee approval. CM will place referral but [facility] if [facility] chair cannot be ordered while inpatient.     1554 - Auth approved for [facility] for 5 days (2/8-2/12). Auth ID # being generated at this time. CM inquired when facility could accept patient, will likely d/c tomorrow.     OLGA Newton  Care Management

## 2024-02-08 NOTE — PROGRESS NOTES
End of Shift Note    Bedside shift change report given to Hayder RN (oncoming nurse) by Stella Carson RN (offgoing nurse).  Report included the following information Nurse Handoff Report      Shift worked: 7p - 7a   Shift summary and any significant changes:    No acute changes   Concerns for physician to address:  None   Zone phone for oncoming shift:   [phone]     Patient Information  Baylee Martin  73 y.o.  2/4/2024 10:23 AM by Yvette Toney MD. Baylee Martin was admitted from Home    Problem List  Patient Active Problem List    Diagnosis Date Noted    Confusion 02/06/2024    Altered mental status 02/04/2024    CVA (cerebral vascular accident) (HCC) 06/20/2018    Chronic hepatitis C without hepatic coma (HCC) 03/23/2018    High cholesterol 03/23/2018     History reviewed. No pertinent past medical history.    Core Measures:  CVA: No Yes  CHF:No No  PNA:NoNo    Activity:  Activity: In bed  Number times ambulated in hallways past shift: 0  Number of times OOB to chair past shift: 0    Cardiac:   Cardiac Monitoring: Yes      Cardiac Rhythm: Sinus rhythm    Access:   Current line(s): PIV      Genitourinary:   Urinary status: voiding , external catheter      Respiratory:   O2 Device: None (Room air)  Chronic home O2 use?: no  Incentive spirometer at bedside: no       GI:  Last BM (including prior to admit): 02/07/24  Current diet:  ADULT DIET; Regular  Passing flatus: yes  Tolerating current diet: yes       Pain Management:   Patient states pain is manageable on current regimen: yes    Skin:  Boom Scale Score: 18  Interventions: float heels, increase time out of bed, and PT/OT consult    Patient Safety:  Fall Score: Grullon Total Score: 60  Interventions: bed/chair alarm, assistive devices (walker, cane, etc.), gripper socks, pt to call before getting OOB, and stay with me (per policy)       DVT prophylaxis:  DVT prophylaxis Med- no  DVT prophylaxis SCD or JOY- yes     Wounds: (If Applicable)  Wounds- no      Active

## 2024-02-08 NOTE — PROGRESS NOTES
Hospitalist Progress Note    NAME:   Baylee Martin   : 1950   MRN: [id number]     Date/Time: 2024 6:11 PM  Patient PCP: No primary care provider on file.    Estimated discharge date:24  Barriers: fever workup      Assessment / Plan:  sepsis  Altered mental status,meningitis ruled out  Question of undiagnosed mild dementia  History of stroke on aspirin and Xarelto  Source of sepsis still not clear.   CT head is negative for any acute process.  Serum alcohol negative.  Urine toxicology within normal limits.  The patient developed fever in the ED course.  Continue to monitor patient in telemetry with neurochecks every 4 hourly.  Bcx-neg   UA negcovid, flu neg  Cxr-no PNA  TSH nl  S/pLP  EEG-no seizure  MRI negative for any acute findings  cont on IV Zosyn,   IV vancomycin discontinued  Discontinue IV acyclovir.  Neurology consult appreciated  CT chest, abdomen and pelvis- trace pericholecystic fluid  US negative  Appreciate ID consult     Tachycardia  Echo grade I diastolic dysfunction  resolved     Hypokalemia  resolved          Medical Decision Making:   I personally reviewed labs:cbc bmp  I personally reviewed imaging:   I personally reviewed EKG:  Toxic drug monitoring:   Discussed case with: pt, RN, CM, Infectious disease attending - Dr. Lozoya      Code Status: full  DVT Prophylaxis: was on xarelto at home- resume home dose of xarelto  GI Prophylaxis:    Subjective:     Chief Complaint / Reason for Physician Visit  Afebrile for last 48 hours  Pleasant lady- denies any new complaints      Objective:     VITALS:   Last 24hrs VS reviewed since prior progress note. Most recent are:  Patient Vitals for the past 24 hrs:   BP Temp Temp src Pulse Resp SpO2   24 1554 137/78 97.9 °F (36.6 °C) Oral (!) 104 18 99 %   24 0751 132/80 98.2 °F (36.8 °C) Oral 80 16 98 %   24 2327 139/89 97.3 °F (36.3 °C) -- 70 17 98 %   24 (!) 152/77 97.5 °F (36.4 °C) Oral 64 20 100 %         No

## 2024-02-08 NOTE — PLAN OF CARE
Problem: Safety - Adult  Goal: Free from fall injury  Flowsheets (Taken 2/7/2024 1929)  Free From Fall Injury:   Instruct family/caregiver on patient safety   Based on caregiver fall risk screen, instruct family/caregiver to ask for assistance with transferring infant if caregiver noted to have fall risk factors

## 2024-02-08 NOTE — CONSULTS
Infectious Disease Consult    Date of Consultation:  February 8, 2024  Date of Admission: 2/4/2024   Referring Physician: Dr. Thomas    Subjective:     Patient is a 73 y.o. female who is being seen for fever of unknown origin.     History of Present Illness     73 year old female with medical hx of multiple strokes on aspirin and Xarelto, hyperlipidemia presented to ER on 2/4 with acute behavior and mental status change.     CT head is negative for any acute process. Serum alcohol negative. Urine toxicology within normal limits. U/A (-), blood cx  from 2/4 & 2/6 no growth so far. Pt was afebrile on admission, became febrile on 2/5 around 3pm with 101.8. WBC 13.9 on admission and currently down 11.6. Pt underwent for LP on 2/6, CSF work up also negative. Pt was started on empiric abx therapy with IV acyclovir, zosyn, and vancomycin.    CT of abd/pel revealed trace pericholecystic fluid without other signs of cholecystitis of  uncertain etiology, follow up US of abdomen revealed No gallstones. No gallbladder wall thickening or pericholecystic fluid. Negative sonographic Cheatham sign. Infectious work up negative so far. [place] virus CSF still pending. No inflammatory markers were checked. Pt was evaluated by neurology team who indicated that suspect with pt with delirium.     ID team was consulted for fever of unknown.    During visit, pt denies any discomfort other than feeling tired. Alert and orient x 4, able to provide detail medical hx. Pt reports having numbness/tingling (pin prick) sensation to all of left fingers and unsteady gate on RLE since her last stroke which was about 2 yrs ago. No fever, chills, mei, sob, chest pain, headache, dysuria, or abdominal pain.     Pt is currently residing with her daughter and grandson who are healthy. Pt's daughter works at the [employer]. Pt's daughter has a dog that pt has little interaction with. No cats or birds. No known tick bite exposure. Pt tends to stay inside. No  enlarged. Mild edema pattern. Pleural  spaces are grossly clear.    Head CT (2/4) no intracranial process  Brain MRI (2/4) no intracranial process    CT of chest, abd, pel (2/7) Trace pericholecystic fluid without other signs of cholecystitis of  uncertain etiology. No other identified cause for fever with incidentals as  above including coronary artery disease, distended urinary bladder, and colonic  diverticulosis.    US of abdomen (2/7)    Gallbladder: No gallstones. No gallbladder wall thickening or pericholecystic  fluid. Negative sonographic Cheatham sign.      Bile ducts: There is no intra or extrahepatic biliary ductal dilatation.  The  common bile duct measures 4 mm.     Pancreas: The visualized portions are within normal limits.      Kidneys: Right length: 10.0 cm. No hydronephrosis.         I have discussed the diagnosis with the patient and the intended plan as seen in the above orders.     I have discussed medication side effects and warnings with the patient as well.    Reviewed test results at length with patient    Plan of care d/w Dr. Lozoya    Signed By: TAPAN Roberts    February 8, 2024

## 2024-02-08 NOTE — PLAN OF CARE
Problem: Occupational Therapy - Adult  Goal: By Discharge: Performs self-care activities at highest level of function for planned discharge setting.  See evaluation for individualized goals.  Description: FUNCTIONAL STATUS PRIOR TO ADMISSION:  ambulated with SPC in the home and rollator walker in the community, has baseline LLE/UE weakness due to old CVA, pt needed assist to step over tub and supervision to assist for bathing seated on shower chair, pts daughter reports pt is fearful of stepping over tub, performed all other ADLS on her own and family performs IADLS   ,  ,  ,  ,  ,  ,  ,  ,  ,  ,       HOME SUPPORT: Patient lived with daughter whom works in a SNF.    Occupational Therapy Goals:  Initiated 2/5/2024  1.  Patient will perform grooming with Supervision seated within 7 day(s).  2.  Patient will perform upper body dressing with Supervision within 7 day(s).  3.  Patient will perform lower body dressing with Moderate Assist within 7 day(s).  4.  Patient will perform toilet transfers with Minimal Assist  within 7 day(s).  5.  Patient will perform all aspects of toileting with Moderate Assist within 7 day(s).      Outcome: Progressing   OCCUPATIONAL THERAPY TREATMENT  Patient: Baylee Martin (73 y.o. female)  Date: 2/8/2024  Primary Diagnosis: Confusion [R41.0]  Altered mental status [R41.82]  Tachycardia [R00.0]  Disorientation [R41.0]       Precautions: Fall Risk, Bed Alarm                Chart, occupational therapy assessment, plan of care, and goals were reviewed.    ASSESSMENT  Patient continues to benefit from skilled OT services and is progressing towards goals. Patient received sitting in bedside chair and agreeable for therapy. She completed LB dressing with stand-by assist and tolerated well. She completed sit > stand and walked into bathroom. Max cues provided for safety awareness and hand placement during device management. She transferred to toilet with min assist and completed rear pericare with  min assist in standing. Patient washed hands at sink with contact guard assist. Patient returned to bedside chair and was left with all needs met and chair alarmed. Recommend patient discharge to SNF rehab.              PLAN :  Patient continues to benefit from skilled intervention to address the above impairments.  Continue treatment per established plan of care to address goals.    Recommendation for discharge: (in order for the patient to meet his/her long term goals): Therapy up to 5 days/week in Skilled nursing facility    Other factors to consider for discharge: poor safety awareness, high risk for falls, not safe to be alone, and concern for safely navigating or managing the home environment    IF patient discharges home will need the following DME: continuing to assess with progress       SUBJECTIVE:   Patient stated “I think I need to use the bathroom.”    OBJECTIVE DATA SUMMARY:   Cognitive/Behavioral Status:  Orientation  Overall Orientation Status: Within Functional Limits  Orientation Level: Oriented X4  Cognition  Overall Cognitive Status: WFL  Arousal/Alertness: Appropriate responses to stimuli  Following Commands: Follows one step commands consistently;Follows multistep commands with repitition    Functional Mobility and Transfers for ADLs:  Bed Mobility:  Bed Mobility Training  Bed Mobility Training: No     Transfers:   Transfer Training  Transfer Training: Yes  Overall Level of Assistance: Minimum assistance;Assist X1;Contact-guard assistance  Sit to Stand: Contact-guard assistance  Stand to Sit: Contact-guard assistance  Toilet Transfer: Minimum assistance           Balance:  Standing: Impaired  Balance  Sitting: Intact  Sitting - Static: Fair (occasional)  Sitting - Dynamic: Fair (occasional)  Standing: Impaired  Standing - Static: Fair;Constant support  Standing - Dynamic: Fair;Constant support      ADL Intervention:                   Grooming: Contact guard assistance   Grooming Skilled  POST-OP DIAGNOSIS:  Diverticulitis 10-Nov-2022 13:20:36  Rayne Mercer

## 2024-02-08 NOTE — PLAN OF CARE
Problem: Physical Therapy - Adult  Goal: By Discharge: Performs mobility at highest level of function for planned discharge setting.  See evaluation for individualized goals.  Description: FUNCTIONAL STATUS PRIOR TO ADMISSION: Patient was modified independent using a rollator for functional mobility.    HOME SUPPORT PRIOR TO ADMISSION: The patient lived with daughter.    Physical Therapy Goals  Initiated 2/5/2024  1.  Patient will move from supine to sit and sit to supine in bed with contact guard assist within 7 day(s).    2.  Patient will perform sit to stand with minimal assistance within 7 day(s).  3.  Patient will transfer from bed to chair and chair to bed with minimal assistance using the least restrictive device within 7 day(s).  4.  Patient will ambulate with minimal assistance for 15 feet with the least restrictive device within 7 day(s).       Outcome: Progressing   PHYSICAL THERAPY TREATMENT    Patient: Baylee Martin (73 y.o. female)  Date: 2/8/2024  Diagnosis: Confusion [R41.0]  Altered mental status [R41.82]  Tachycardia [R00.0]  Disorientation [R41.0] Altered mental status      Precautions: Fall Risk, Bed Alarm                      ASSESSMENT:  Patient continues to benefit from skilled PT services and is progressing towards goals. Pt received sitting in bedside chair, agreeable and eager to participate in therapy. Pt requesting to use restroom, ambulated from chair to bathroom and transferred to toilet. Pt able to independently perform gabe care in sitting and able to stand for brief change with no LOB however required max UE support on RW. Returned to bedside chair for seated rest break. Pt ambulated to and from bedside chair to sink 2x to perform ADLs. During grooming activities, pt with heavy trunk lean onto counter for support and required seated rest break to due increased fatigue. While pivoting to turn around. Significant LOB noted, able to recover with mod A. During ambulation, pt required max

## 2024-02-08 NOTE — PROGRESS NOTES
Pharmacy Antimicrobial Kinetic Dosing    Indication for Antimicrobials: Spesi, AMS, rule out meningitis (on Rivaroxaban, so no LP)    Current Regimen of Each Antimicrobial:  Vancomycin pharmacy to dose; Start Date  ; Day # 5 of   Zosyn 4.5 gm IV x1 then 3.375 gm IV q 8h; Start Date  ; Day # 5 of 7      Previous Antimicrobial Therapy:  Acyclovir 700 mg IV Q12H; Start Date ; Day 5    Goal Level: Vancomycin -600    Date Dose & Interval Measured (mcg/mL) Predicted AUC    1814 By levels 8     2230 1000 mg IV Q24H 7.3 417           Significant Cultures:     Blood = NG   CSF - NG    Labs:  Recent Labs     Units 24  0028 24  0339 24  0035   CREATININE MG/DL 1.13* 1.07* 1.12*   BUN MG/DL 15 15 15   WBC K/uL 11.6* 12.6* 13.9*     Temp (24hrs), Av.8 °F (36.6 °C), Min:97.3 °F (36.3 °C), Max:98.2 °F (36.8 °C)      Conditions for Dosing Consideration: None    Creatinine Clearance (mL/min): Estimated Creatinine Clearance: 41 mL/min (A) (based on SCr of 1.13 mg/dL (H)).       Impression/Plan:     Continue Vancomycin  to 1250 mg IV q 24h for AUC of 502. Level today  Continue Piperacillin-Tazobactam.    Ordered procalcitonin for the AM.    Antimicrobial stop date:  Vancomycin = 7 days     Pharmacy will follow daily and adjust medications as appropriate for renal function and/or serum levels.    Thank you,  Davi Horne [facility]

## 2024-02-09 ENCOUNTER — APPOINTMENT (OUTPATIENT)
Facility: HOSPITAL | Age: 74
DRG: 871 | End: 2024-02-09
Payer: MEDICARE

## 2024-02-09 VITALS
WEIGHT: 151.46 LBS | TEMPERATURE: 97.3 F | RESPIRATION RATE: 16 BRPM | HEART RATE: 107 BPM | DIASTOLIC BLOOD PRESSURE: 79 MMHG | OXYGEN SATURATION: 98 % | HEIGHT: 62 IN | SYSTOLIC BLOOD PRESSURE: 136 MMHG | BODY MASS INDEX: 27.87 KG/M2

## 2024-02-09 PROBLEM — F09 COGNITIVE DYSFUNCTION: Status: ACTIVE | Noted: 2024-02-09

## 2024-02-09 PROBLEM — R50.9 FEVER: Status: ACTIVE | Noted: 2024-02-09

## 2024-02-09 PROBLEM — D72.825 BANDEMIA: Status: ACTIVE | Noted: 2024-02-09

## 2024-02-09 PROBLEM — R00.0 TACHYCARDIA: Status: ACTIVE | Noted: 2024-02-09

## 2024-02-09 LAB
ANION GAP SERPL CALC-SCNC: 7 MMOL/L (ref 5–15)
BACTERIA SPEC CULT: NORMAL
BACTERIA SPEC CULT: NORMAL
BASOPHILS # BLD: 0.1 K/UL (ref 0–0.1)
BASOPHILS NFR BLD: 1 % (ref 0–1)
BUN SERPL-MCNC: 15 MG/DL (ref 6–20)
BUN/CREAT SERPL: 13 (ref 12–20)
CALCIUM SERPL-MCNC: 8.6 MG/DL (ref 8.5–10.1)
CHLORIDE SERPL-SCNC: 109 MMOL/L (ref 97–108)
CO2 SERPL-SCNC: 24 MMOL/L (ref 21–32)
CREAT SERPL-MCNC: 1.18 MG/DL (ref 0.55–1.02)
DIFFERENTIAL METHOD BLD: ABNORMAL
EOSINOPHIL # BLD: 0.3 K/UL (ref 0–0.4)
EOSINOPHIL NFR BLD: 2 % (ref 0–7)
ERYTHROCYTE [DISTWIDTH] IN BLOOD BY AUTOMATED COUNT: 13.8 % (ref 11.5–14.5)
GLUCOSE SERPL-MCNC: 142 MG/DL (ref 65–100)
HCT VFR BLD AUTO: 34.9 % (ref 35–47)
HGB BLD-MCNC: 11.1 G/DL (ref 11.5–16)
IMM GRANULOCYTES # BLD AUTO: 0.1 K/UL (ref 0–0.04)
IMM GRANULOCYTES NFR BLD AUTO: 1 % (ref 0–0.5)
LYMPHOCYTES # BLD: 3.9 K/UL (ref 0.8–3.5)
LYMPHOCYTES NFR BLD: 26 % (ref 12–49)
MCH RBC QN AUTO: 26.6 PG (ref 26–34)
MCHC RBC AUTO-ENTMCNC: 31.8 G/DL (ref 30–36.5)
MCV RBC AUTO: 83.5 FL (ref 80–99)
MONOCYTES # BLD: 1.2 K/UL (ref 0–1)
MONOCYTES NFR BLD: 8 % (ref 5–13)
NEUTS SEG # BLD: 9.7 K/UL (ref 1.8–8)
NEUTS SEG NFR BLD: 62 % (ref 32–75)
NRBC # BLD: 0 K/UL (ref 0–0.01)
NRBC BLD-RTO: 0 PER 100 WBC
PLATELET # BLD AUTO: 218 K/UL (ref 150–400)
PMV BLD AUTO: 11.5 FL (ref 8.9–12.9)
POTASSIUM SERPL-SCNC: 3.8 MMOL/L (ref 3.5–5.1)
PROCALCITONIN SERPL-MCNC: <0.05 NG/ML
RBC # BLD AUTO: 4.18 M/UL (ref 3.8–5.2)
SERVICE CMNT-IMP: NORMAL
SODIUM SERPL-SCNC: 140 MMOL/L (ref 136–145)
WBC # BLD AUTO: 15.2 K/UL (ref 3.6–11)

## 2024-02-09 PROCEDURE — 36415 COLL VENOUS BLD VENIPUNCTURE: CPT

## 2024-02-09 PROCEDURE — 97116 GAIT TRAINING THERAPY: CPT

## 2024-02-09 PROCEDURE — 71045 X-RAY EXAM CHEST 1 VIEW: CPT

## 2024-02-09 PROCEDURE — 6370000000 HC RX 637 (ALT 250 FOR IP): Performed by: INTERNAL MEDICINE

## 2024-02-09 PROCEDURE — 97530 THERAPEUTIC ACTIVITIES: CPT

## 2024-02-09 PROCEDURE — 99232 SBSQ HOSP IP/OBS MODERATE 35: CPT | Performed by: INTERNAL MEDICINE

## 2024-02-09 PROCEDURE — 6360000002 HC RX W HCPCS: Performed by: INTERNAL MEDICINE

## 2024-02-09 PROCEDURE — 80048 BASIC METABOLIC PNL TOTAL CA: CPT

## 2024-02-09 PROCEDURE — 84145 PROCALCITONIN (PCT): CPT

## 2024-02-09 PROCEDURE — 85025 COMPLETE CBC W/AUTO DIFF WBC: CPT

## 2024-02-09 PROCEDURE — 2580000003 HC RX 258: Performed by: INTERNAL MEDICINE

## 2024-02-09 RX ADMIN — FLUTICASONE PROPIONATE 1 SPRAY: 50 SPRAY, METERED NASAL at 09:48

## 2024-02-09 RX ADMIN — PIPERACILLIN AND TAZOBACTAM 3375 MG: 3; .375 INJECTION, POWDER, FOR SOLUTION INTRAVENOUS; PARENTERAL at 05:34

## 2024-02-09 RX ADMIN — SODIUM CHLORIDE, PRESERVATIVE FREE 10 ML: 5 INJECTION INTRAVENOUS at 09:55

## 2024-02-09 RX ADMIN — ASPIRIN 81 MG: 81 TABLET, CHEWABLE ORAL at 09:49

## 2024-02-09 RX ADMIN — SODIUM CHLORIDE: 9 INJECTION, SOLUTION INTRAVENOUS at 05:33

## 2024-02-09 RX ADMIN — Medication: at 09:56

## 2024-02-09 RX ADMIN — ATORVASTATIN CALCIUM 40 MG: 40 TABLET, FILM COATED ORAL at 09:49

## 2024-02-09 RX ADMIN — RIVAROXABAN 2.5 MG: 2.5 TABLET, FILM COATED ORAL at 09:49

## 2024-02-09 NOTE — PLAN OF CARE
Problem: Physical Therapy - Adult  Goal: By Discharge: Performs mobility at highest level of function for planned discharge setting.  See evaluation for individualized goals.  Description: FUNCTIONAL STATUS PRIOR TO ADMISSION: Patient was modified independent using a rollator for functional mobility.    HOME SUPPORT PRIOR TO ADMISSION: The patient lived with daughter.    Physical Therapy Goals  Initiated 2/5/2024  1.  Patient will move from supine to sit and sit to supine in bed with contact guard assist within 7 day(s).    2.  Patient will perform sit to stand with minimal assistance within 7 day(s).  3.  Patient will transfer from bed to chair and chair to bed with minimal assistance using the least restrictive device within 7 day(s).  4.  Patient will ambulate with minimal assistance for 15 feet with the least restrictive device within 7 day(s).       Outcome: Progressing   PHYSICAL THERAPY TREATMENT    Patient: Baylee Martin (73 y.o. female)  Date: 2/9/2024  Diagnosis: Confusion [R41.0]  Altered mental status [R41.82]  Tachycardia [R00.0]  Disorientation [R41.0] Altered mental status      Precautions: Fall Risk, Bed Alarm                      ASSESSMENT:  Patient continues to benefit from skilled PT services and is progressing towards goals. Pt received sitting in bedside chair, agreeable to participate in therapy. Ambulated to and from bathroom and performed toileting task. Pt with poor balance during gabe care as she has significant forward trunk lean and uses RW for UE support. Ambulated to sink to continue completion of ADLs. While standing at the sink, pt leaning heavily on sink for support due to decreased strength in LE and increased fatigue. Required frequent seated rest breaks for recovery. Ambulated back to chair, continues to require max cueing for RW management and sequencing as pt pushes RW too far anterior, able to correct but unable to maintain. Pt positioned to comfort sitting in bedside chair  with all needs within reach.          PLAN:  Patient continues to benefit from skilled intervention to address the above impairments.  Continue treatment per established plan of care.    Recommendation for discharge: (in order for the patient to meet his/her long term goals): Therapy up to 5 days/week in Skilled nursing facility    Other factors to consider for discharge: patient's current support system is unable to meet their requirements for physical assistance, poor safety awareness, high risk for falls, not safe to be alone, and concern for safely navigating or managing the home environment    IF patient discharges home will need the following DME: continuing to assess with progress       SUBJECTIVE:   Patient stated, \"My legs just get so tired.\"    OBJECTIVE DATA SUMMARY:   Critical Behavior:  Orientation  Overall Orientation Status: Within Functional Limits  Orientation Level: Oriented X4  Cognition  Overall Cognitive Status: WFL  Arousal/Alertness: Appropriate responses to stimuli  Following Commands: Follows one step commands consistently;Follows multistep commands with repitition    Functional Mobility Training:  Bed Mobility:  Bed Mobility Training  Bed Mobility Training: No  Transfers:  Transfer Training  Transfer Training: Yes  Overall Level of Assistance: Minimum assistance;Assist X1;Contact-guard assistance  Sit to Stand: Contact-guard assistance  Stand to Sit: Contact-guard assistance  Toilet Transfer: Minimum assistance  Balance:  Balance  Sitting: Intact  Sitting - Static: Fair (occasional)  Sitting - Dynamic: Fair (occasional)  Standing: Impaired  Standing - Static: Fair;Constant support  Standing - Dynamic: Fair;Constant support   Ambulation/Gait Training:     Gait  Gait Training: Yes  Overall Level of Assistance: Minimum assistance;Assist X1  Distance (ft): 15 Feet  Assistive Device: Walker, rolling;Gait belt  Interventions: Safety awareness training;Tactile cues;Verbal cues;Demonstration  Base

## 2024-02-09 NOTE — PROGRESS NOTES
Per Dr. Lozoya patient's  lab work can be  with [facility], can do as outpt. She is clear from her standpoint to MD. [facility] is here to take patient to SNF

## 2024-02-09 NOTE — PROGRESS NOTES
cholesterol    Altered mental status    Confusion        XR Results (most recent):  CXR (2/4) Cardiac mediastinal silhouette is mildly enlarged. Mild edema pattern. Pleural  spaces are grossly clear.    Head CT (2/4) no intracranial process  Brain MRI (2/4) no intracranial process    CT of chest, abd, pel (2/7) Trace pericholecystic fluid without other signs of cholecystitis of  uncertain etiology. No other identified cause for fever with incidentals as  above including coronary artery disease, distended urinary bladder, and colonic  diverticulosis.    US of abdomen (2/7)    Gallbladder: No gallstones. No gallbladder wall thickening or pericholecystic  fluid. Negative sonographic Cheatham sign.      Bile ducts: There is no intra or extrahepatic biliary ductal dilatation.  The  common bile duct measures 4 mm.     Pancreas: The visualized portions are within normal limits.      Kidneys: Right length: 10.0 cm. No hydronephrosis.       Objective:    Blood pressure (!) 158/82, pulse 100, temperature 99 °F (37.2 °C), temperature source Oral, resp. rate 18, height 1.575 m (5' 2.01\"), weight 68.7 kg (151 lb 7.3 oz), SpO2 96 %.    Physical Exam:   BP (!) 158/82   Pulse 100   Temp 99 °F (37.2 °C) (Oral)   Resp 18   Ht 1.575 m (5' 2.01\")   Wt 68.7 kg (151 lb 7.3 oz)   SpO2 96%   BMI 27.69 kg/m²     BP (!) 158/82   Pulse 100   Temp 99 °F (37.2 °C) (Oral)   Resp 18   Ht 1.575 m (5' 2.01\")   Wt 68.7 kg (151 lb 7.3 oz)   SpO2 96%   BMI 27.69 kg/m²     PHYSICAL EXAM:  General: WD, WN. Alert, cooperative, no acute distress    EENT:  EOMI. Anicteric sclerae. MMM  Resp:  Clear in apex with decreased breath sounds at bases,  no wheezing or rales.  No accessory muscle use  CV:  Regular  rhythm,  trace edema  GI:  Soft, Non distended, Non tender.  +Bowel sounds  Neurologic:  Alert and oriented X 3, normal speech,   Psych:   Good insight. Not anxious nor agitated  Skin:  No rashes.  No jaundice     I have discussed the

## 2024-02-09 NOTE — PROGRESS NOTES
End of Shift Note     Bedside shift change report given to JEROME Singletary (oncoming nurse) by Ivett Crawford RN (offgoing nurse).  Report included the following information Nurse Handoff Report        Shift worked: 7p- 7a   Shift summary and any significant changes:     No acute changes   Concerns for physician to address:  None   Zone phone for oncoming shift:   [phone]      Patient Information  Baylee Martin  73 y.o.  2/4/2024 10:23 AM by Yvette Toney MD. Baylee Martin was admitted from Home     Problem List       Patient Active Problem List     Diagnosis Date Noted    Confusion 02/06/2024    Altered mental status 02/04/2024    CVA (cerebral vascular accident) (HCC) 06/20/2018    Chronic hepatitis C without hepatic coma (HCC) 03/23/2018    High cholesterol 03/23/2018      Past Medical History   History reviewed. No pertinent past medical history.        Core Measures:  CVA: No Yes  CHF:No No  PNA:NoNo     Activity:  Activity: In bed  Number times ambulated in hallways past shift: 0  Number of times OOB to chair past shift: 0     Cardiac:   Cardiac Monitoring: Yes      Cardiac Rhythm: Sinus rhythm     Access:   Current line(s): PIV        Genitourinary:   Urinary status: voiding , external catheter        Respiratory:   O2 Device: None (Room air)  Chronic home O2 use?: no  Incentive spirometer at bedside: no     GI:  Last BM (including prior to admit): 02/07/24  Current diet:  ADULT DIET; Regular  Passing flatus: yes  Tolerating current diet: yes     Pain Management:   Patient states pain is manageable on current regimen: yes     Skin:  Boom Scale Score: 18  Interventions: float heels, increase time out of bed, and PT/OT consult    Patient Safety:  Fall Score: [place] Total Score: 60  Interventions: bed/chair alarm, assistive devices (walker, cane, etc.), gripper socks, pt to call before getting OOB, and stay with me (per policy)     DVT prophylaxis:  DVT prophylaxis Med- no  DVT prophylaxis SCD or JOY- yes      Wounds: (If

## 2024-02-09 NOTE — PROGRESS NOTES
Called rehab that patient is going to and gave report to nurse named blue. All of nurse's questions were answered. EMS provided transport. IV was removed.

## 2024-02-09 NOTE — DISCHARGE SUMMARY
Discharge Summary    Name: Baylee Martin  [id number]  YOB: 1950 (Age: 73 y.o.)   Date of Admission: 2/4/2024  Date of Discharge: 2/9/2024  Attending Physician: Anika Thomas MD    Discharge Diagnosis:   Sepsis on unclear origin  Acute metabolic encephalopathy  Hx of stroke on aspirin and xarelto  Acute hypokalemia      Consultations:  IP CONSULT TO HOSPITALIST  IP CONSULT TO NEUROLOGY  IP CONSULT TO PHARMACY  IP CONSULT TO INFECTIOUS DISEASES  IP CONSULT TO CASE MANAGEMENT      Brief Admission History/Reason for Admission Per Yvette Toney MD:   Baylee Martin is a 73 y.o.  female with PMHx significant for multiple strokes on aspirin and Xarelto.  Today she woke up confused, agitated and was restless and has been talking all the time.  Her daughter was concerned and called the EMS.  Patient was given Haldol without any significant improvement.  She was brought to the ED for further evaluation.  History is limited because of patient's somnolence and is based on conversation with ED physician, daughter at the bedside and review of past records.     As per the daughter, patient did not have any headache, neck pain, any rash, nausea, vomiting, abdominal pain or diarrhea.  No chest pain as well.  She thinks that patient might have undiagnosed mild dementia at baseline.  She had similarly incident last year while eating in a restaurant but it was attributed to alcohol at the time.    Brief Hospital Course by Main Problems:   Sepsis of unclear origin  S/p LP on 2/6- CSF count negative, meningitis panel negative, crypto -ve,  Viral panel negative  CT abdomen/pelvis and chest shows gabe cholecystic fluid, however, US abdomen negative.  Treated with vancomycin and zosyn  ID consulted  Heart rate intermittently high as patient is anxious. Was crying on phone with family member when she was told that she is going to SNF.  White count still elevated  Patient can be discharged

## 2024-02-09 NOTE — PLAN OF CARE
Problem: Safety - Medical Restraint  Goal: Remains free of injury from restraints (Restraint for Interference with Medical Device)  Description: INTERVENTIONS:  1. Determine that other, less restrictive measures have been tried or would not be effective before applying the restraint  2. Evaluate the patient's condition at the time of restraint application  3. Inform patient/family regarding the reason for restraint  4. Q2H: Monitor safety, psychosocial status, comfort, nutrition and hydration  Outcome: Progressing     Problem: Discharge Planning  Goal: Discharge to home or other facility with appropriate resources  Outcome: Progressing     Problem: Skin/Tissue Integrity  Goal: Absence of new skin breakdown  Description: 1.  Monitor for areas of redness and/or skin breakdown  2.  Assess vascular access sites hourly  3.  Every 4-6 hours minimum:  Change oxygen saturation probe site  4.  Every 4-6 hours:  If on nasal continuous positive airway pressure, respiratory therapy assess nares and determine need for appliance change or resting period.  Outcome: Progressing     Problem: Safety - Adult  Goal: Free from fall injury  Outcome: Progressing     Problem: ABCDS Injury Assessment  Goal: Absence of physical injury  Outcome: Progressing     Problem: Confusion  Goal: Confusion, delirium, dementia, or psychosis is improved or at baseline  Description: INTERVENTIONS:  1. Assess for possible contributors to thought disturbance, including medications, impaired vision or hearing, underlying metabolic abnormalities, dehydration, psychiatric diagnoses, and notify attending LIP  2. [place] high risk fall precautions, as indicated  3. Provide frequent short contacts to provide reality reorientation, refocusing and direction  4. Decrease environmental stimuli, including noise as appropriate  5. Monitor and intervene to maintain adequate nutrition, hydration, elimination, sleep and activity  6. If unable to ensure safety without  constant attention obtain sitter and review sitter guidelines with assigned personnel  7. Initiate Psychosocial CNS and Spiritual Care consult, as indicated  Outcome: Progressing     Problem: Chronic Conditions and Co-morbidities  Goal: Patient's chronic conditions and co-morbidity symptoms are monitored and maintained or improved  Outcome: Progressing     Problem: Neurosensory - Adult  Goal: Achieves stable or improved neurological status  Outcome: Progressing  Goal: Absence of seizures  Outcome: Progressing  Goal: Remains free of injury related to seizures activity  Outcome: Progressing     Problem: Respiratory - Adult  Goal: Achieves optimal ventilation and oxygenation  Outcome: Progressing     Problem: Cardiovascular - Adult  Goal: Maintains optimal cardiac output and hemodynamic stability  Outcome: Progressing     Problem: Skin/Tissue Integrity - Adult  Goal: Skin integrity remains intact  Outcome: Progressing     Problem: Musculoskeletal - Adult  Goal: Return mobility to safest level of function  Outcome: Progressing     Problem: Gastrointestinal - Adult  Goal: Minimal or absence of nausea and vomiting  Outcome: Progressing     Problem: Genitourinary - Adult  Goal: Absence of urinary retention  Outcome: Progressing     Problem: Infection - Adult  Goal: Absence of infection at discharge  Outcome: Progressing     Problem: Metabolic/Fluid and Electrolytes - Adult  Goal: Electrolytes maintained within normal limits  Outcome: Progressing  Goal: Hemodynamic stability and optimal renal function maintained  Outcome: Progressing     Problem: Hematologic - Adult  Goal: Maintains hematologic stability  Outcome: Progressing     Problem: Physical Therapy - Adult  Goal: By Discharge: Performs mobility at highest level of function for planned discharge setting.  See evaluation for individualized goals.  Description: FUNCTIONAL STATUS PRIOR TO ADMISSION: Patient was modified independent using a rollator for functional

## 2024-02-09 NOTE — CARE COORDINATION
Transition of Care Plan to SNF/Rehab    Communication to Patient/Family:  Met with patient and family and they are agreeable to the transition plan. The Plan for Transition of Care is related to the following treatment goals: SNF    The Patient and/or patient representative was provided with a choice of provider and agrees  with the discharge plan.      Yes [x] No []    A Freedom of choice list was provided with basic dialogue that supports the patient's individualized plan of care/goals and shares the quality data associated with the providers.       Yes [x] No []    SNF/Rehab Transition:  Patient has been accepted to [facility] and meets criteria for admission.   Patient will transported by Hospital to home and expected to leave at 2 pm    Communication to SNF/Rehab:  Bedside RN, ban, has been notified to update the transition plan to the facility and call report (phone [phone]  Discharge information has been updated on the AVS. And communicated to facility via [facility], or CC link.     Discharge instructions to be fax'd to facility at (Fax #[phone]    Nursing Please include all hard scripts for controlled substances, med rec and dc summary, and AVS in packet.     Reviewed and confirmed with facility, [facility], can manage the patient care needs for the following:     [place] with (X) only those applicable:  Medication:  [x]Medications are available at the facility  []IV Antibiotics    [x]Controlled Substance - hard copies available sent.  []Weekly Labs    Equipment:  []CPAP/BiPAP  []Wound Vacuum  []Butcher or Urinary Device  []PICC/Central Line  []Nebulizer  []Ventilator    Treatment:  []Isolation (for MRSA, VRE, etc.)  []Surgical Drain Management  []Tracheostomy Care  []Dressing Changes  []Dialysis with transportation  []PEG Care  []Oxygen  []Daily Weights for Heart Failure    Dietary:  []Any diet limitations  []Tube Feedings   []Total Parenteral Management (TPN)     Financial Resources:  []Medicaid Application Completed    []UAI Completed and copy given to pt/family  and copy given to pt/family  []A screening has previously been completed.    []Level II Completed    [] Private pay individual who will not become   financially eligible for Medicaid within 6 months from admission to a [facility].     [] Individual refused to have screening conducted.     []Medicaid Application Completed    []The screening denied because it was determined individual did not need/did not qualify for nursing facility level of care.  [] Out of state residents seeking direct admission to a VA nursing facility.  [] Individuals who are inpatients of an out of state hospital, or in state or out of state veterans/ hospital and seek direct admission to a VA nursing facility  [] Individuals who are pateints or residents of a state owned/operated facility that is licensed by Department of Behavioral Services (DBS) and seek direct admission to VA nursing facility  [] A screening not required for enrollment in Medicaid Hospice services as set out in 12 VAC 30-  [] [facility] ([facility]) staff shall perform screenings of the [facility] clients.    Advanced Care Plan:  []Surrogate Decision Maker of Care  []POA  []Communicated Code Status and copy sent.    Other:   Full code      Spoke with Meghan at [facility] and they auth received and they can accept patient today they will contact family- CM made patient aware that she is going to short term rehab- Discharge to [facility] today. JILL LEWIS, MSW  [phone]

## 2024-02-10 LAB
BACTERIA SPEC CULT: NORMAL
BACTERIA SPEC CULT: NORMAL
SERVICE CMNT-IMP: NORMAL
SERVICE CMNT-IMP: NORMAL

## 2024-02-12 ENCOUNTER — OFFICE VISIT (OUTPATIENT)
Facility: CLINIC | Age: 74
End: 2024-02-12
Payer: MEDICARE

## 2024-02-12 DIAGNOSIS — I63.9 CEREBROVASCULAR ACCIDENT (CVA), UNSPECIFIED MECHANISM (HCC): ICD-10-CM

## 2024-02-12 DIAGNOSIS — R53.1 GENERALIZED WEAKNESS: ICD-10-CM

## 2024-02-12 DIAGNOSIS — G93.41 ACUTE METABOLIC ENCEPHALOPATHY: Primary | ICD-10-CM

## 2024-02-12 DIAGNOSIS — E78.5 HYPERLIPIDEMIA, UNSPECIFIED HYPERLIPIDEMIA TYPE: ICD-10-CM

## 2024-02-12 PROCEDURE — 99306 1ST NF CARE HIGH MDM 50: CPT | Performed by: INTERNAL MEDICINE

## 2024-02-12 PROCEDURE — 1123F ACP DISCUSS/DSCN MKR DOCD: CPT | Performed by: INTERNAL MEDICINE

## 2024-02-12 PROCEDURE — G8484 FLU IMMUNIZE NO ADMIN: HCPCS | Performed by: INTERNAL MEDICINE

## 2024-02-12 NOTE — PROGRESS NOTES
PLACE OF SERVICE:  [facility] [address], [place] [address]     H & P    2/12/2024    CC:  Altered mental status    [place]:  73 y.o.  female with PMHx of multiple strokes on aspirin and Xarelto was admitted in the hospital for acute confusion noted by the family when she woke up in the morning.  Patient was noted to be restless and agitated.  Patient was brought to the ED and did receive antipsychotic but it did not help.  Her diagnostic workup was negative for acute stroke.  Clinical symptoms seemed initially to be attributed by possible infection and she has received antibiotics and ID consult but diagnostic workup is negative.  Her CSF exam was negative for any evidence of meningitis.  She currently improved and overall condition is stabilized in the hospital.  She is fully alert now.  She is admitted in the left extremity for deconditioning and generalized weakness, she receives weak in her right side which was previously CVA.  She able to walk with help of walker she is awake and alert and cooperative.  She provides history in detail she is good historian.  She denies chest pain shortness breath denies nausea vomiting abdominal distention.  Earlier in the nursing home she was given metformin which she claims she has stopped so that because of a bout of diarrhea but now she feels better.  Denies abdominal pain nausea vomiting.  She later diabetes controlled.  She is well-motivated and looks forward to physical therapy and rehabilitation.  Stronger before going home.  She lives with her daughter as well as grandson.  Written consent for this H&P 45 minutes.  Reviewed all records and medical labs and interviewed patient in detail.    PMH:   CVA, right hemiparesis, gait problem, hyperlipidemia, sleep disorder, diabetes type 2 not on treatment, seasonal allergies    ROS:   Pertinent positive and negative ROS discussed in the history of present illness rest of the  following system review

## 2024-02-13 ENCOUNTER — OFFICE VISIT (OUTPATIENT)
Facility: CLINIC | Age: 74
End: 2024-02-13
Payer: MEDICARE
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
## 2024-02-13 DIAGNOSIS — R53.1 GENERALIZED WEAKNESS: ICD-10-CM

## 2024-02-13 DIAGNOSIS — I63.9 CEREBROVASCULAR ACCIDENT (CVA), UNSPECIFIED MECHANISM (HCC): ICD-10-CM

## 2024-02-13 LAB
BACTERIA SPEC CULT: NORMAL
GRAM STN SPEC: NORMAL
GRAM STN SPEC: NORMAL
SERVICE CMNT-IMP: NORMAL

## 2024-02-13 PROCEDURE — 99309 SBSQ NF CARE MODERATE MDM 30: CPT | Performed by: INTERNAL MEDICINE

## 2024-02-13 PROCEDURE — 1123F ACP DISCUSS/DSCN MKR DOCD: CPT | Performed by: INTERNAL MEDICINE

## 2024-02-13 PROCEDURE — G8484 FLU IMMUNIZE NO ADMIN: HCPCS | Performed by: INTERNAL MEDICINE

## 2024-02-14 NOTE — PROGRESS NOTES
PLACE OF SERVICE:  [facility] [address] [place] [address] Rd, [place] [address]     SNF visit      2/13/2024    CC:  Asked to see patient as there is concern that she had fall this morning    [place]:  73 y.o.  female with PMHx of multiple strokes on aspirin and Xarelto was admitted in the hospital for acute confusion noted by the family when she woke up in the morning.  Patient was noted to be restless and agitated.  Patient was brought to the ED and did receive antipsychotic but it did not help.  Her diagnostic workup was negative for acute stroke.  Clinical symptoms seemed initially to be attributed by possible infection and she has received antibiotics and ID consult but diagnostic workup is negative.  Her CSF exam was negative for any evidence of meningitis.  She currently improved and overall condition is stabilized in the hospital.  She is fully alert now.  She is admitted in the left extremity for deconditioning and generalized weakness, she is seen as above.  Patient completed 1 shower and when she was trying to sit up on the wheelchair she slid down and fell on her left side but she claims she did not have direct fall.  This is documented as a fall, she denies active trauma, she denies back pain, she denies any limb pain.    PMH:   CVA, right hemiparesis, gait problem, hyperlipidemia, sleep disorder, diabetes type 2 not on treatment, seasonal allergies    ROS:   Cardiac negative pulmonary negative    Medications: Reviewed in EMR and assessment and plan    Social History:    Remote history of smoking few cigarettes a day, quit smoking almost 30 years ago, denies illicit drug use, occasional alcohol use.    Family History:    Reviewed     Code Status:  Full code    Allergies: Reviewed  No Known Allergies    Medications: Reviewed in EMR.    Vitals:     /70, HR 74, temp 97, RR 18        Exam:  Constitutional: No acute distress; pleasant cooperative  Eyes: Sclera clear, PERRLA;  None

## 2024-02-16 ENCOUNTER — OFFICE VISIT (OUTPATIENT)
Facility: CLINIC | Age: 74
End: 2024-02-16

## 2024-02-16 DIAGNOSIS — B18.2 CHRONIC HEPATITIS C WITHOUT HEPATIC COMA (HCC): ICD-10-CM

## 2024-02-16 DIAGNOSIS — I63.9 CEREBROVASCULAR ACCIDENT (CVA), UNSPECIFIED MECHANISM (HCC): Primary | ICD-10-CM

## 2024-02-16 DIAGNOSIS — E78.5 HYPERLIPIDEMIA, UNSPECIFIED HYPERLIPIDEMIA TYPE: ICD-10-CM

## 2024-02-16 DIAGNOSIS — R53.1 GENERALIZED WEAKNESS: ICD-10-CM

## 2024-02-16 NOTE — PROGRESS NOTES
PLACE OF SERVICE:  [facility] [address], [place] [address]     SKILLED VISIT    2/16/2024      CC:  Patient is seen upon her request and followup     [place]:  73 y.o.  female with PMHx of multiple strokes on aspirin and Xarelto was admitted in the hospital for acute confusion noted by the family when she woke up in the morning.  Patient was noted to be restless and agitated.  Patient was brought to the ED and did receive antipsychotic but it did not help.  Her diagnostic workup was negative for acute stroke.  Clinical symptoms seemed initially to be attributed by possible infection and she has received antibiotics and ID consult but diagnostic workup is negative.  Her CSF exam was negative for any evidence of meningitis.  She currently improved and overall condition is stabilized in the hospital.  She is fully alert now.  She is admitted  for deconditioning and generalized weakness, she is seen as above.  She wanted to be medically checked and also she is concerned for discharge.  She is gradually working with physical therapy.  Discussed her progress with physical therapy, she walks with help of walker she is mainly in the wheelchair for transfers, she has right-sided weakness which became relatively more pronounced after recent hospitalization but gradually improving.  Discussed progress with RN, no obvious fall except for days ago.  She is without aphasia.  She states it was.  And continues to be working with physical therapy, she is inquiring about her discharge planning.  Denies chest pain shortness breath dizziness headache.  Slept good last night.    PMH:   CVA, right hemiparesis, gait problem, hyperlipidemia, sleep disorder, diabetes type 2 not on treatment, seasonal allergies    ROS:   Cardiac negative pulmonary negative    Medications: Reviewed in EMR and assessment and plan    Social History:    Remote history of smoking few cigarettes a day, quit smoking almost 30 years ago,

## 2024-02-19 ENCOUNTER — OFFICE VISIT (OUTPATIENT)
Facility: CLINIC | Age: 74
End: 2024-02-19
Payer: MEDICARE
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
## 2024-02-19 DIAGNOSIS — E78.5 HYPERLIPIDEMIA, UNSPECIFIED HYPERLIPIDEMIA TYPE: ICD-10-CM

## 2024-02-19 DIAGNOSIS — R53.1 GENERALIZED WEAKNESS: Primary | ICD-10-CM

## 2024-02-19 DIAGNOSIS — I63.9 CEREBROVASCULAR ACCIDENT (CVA), UNSPECIFIED MECHANISM (HCC): ICD-10-CM

## 2024-02-19 PROCEDURE — 1123F ACP DISCUSS/DSCN MKR DOCD: CPT | Performed by: INTERNAL MEDICINE

## 2024-02-19 PROCEDURE — 99309 SBSQ NF CARE MODERATE MDM 30: CPT | Performed by: INTERNAL MEDICINE

## 2024-02-19 PROCEDURE — G8484 FLU IMMUNIZE NO ADMIN: HCPCS | Performed by: INTERNAL MEDICINE

## 2024-02-20 NOTE — PROGRESS NOTES
PLACE OF SERVICE:  [facility] [address], [place] [address]     SKILLED VISIT    2/19/2024    PLACE OF SERVICE:  [facility] [address], [place] [address]     SKILLED VISIT    2/19/2024      CC:  Follow-up and monitoring progress  Lab reviewed and discussed with patient    [place]:  73 y.o.  female with PMHx of multiple strokes on aspirin and Xarelto was admitted in the hospital for acute confusion noted by the family when she woke up in the morning.  Patient was noted to be restless and agitated.  Patient was brought to the ED and did receive antipsychotic but it did not help.  Her diagnostic workup was negative for acute stroke.  Clinical symptoms seemed initially to be attributed by possible infection and she has received antibiotics and ID consult but diagnostic workup is negative.  Her CSF exam was negative for any evidence of meningitis.  She currently improved and overall condition is stabilized in the hospital.  She is fully alert now.  She is admitted  for deconditioning and generalized weakness, she is seen as above.  She is here for follow-up.  She is gradually working with physical therapy.  Discussed her progress with physical therapy, she walks with help of walker she is mainly in the wheelchair for transfers, she has right-sided weakness which became relatively more pronounced after recent hospitalization but gradually improving.  Discussed progress with RN, no obvious fall except few days ago, patient claims this was not a fall either..  She is without aphasia.  She stays stable and continues to be working with physical therapy, she is inquiring about her discharge planning.  Denies chest pain shortness breath dizziness headache.  Slept good last night.    PMH:   CVA, right hemiparesis, gait problem, hyperlipidemia, sleep disorder, diabetes type 2 not on treatment, seasonal allergies    ROS:   Cardiac negative pulmonary negative    Medications:

## 2024-02-21 ENCOUNTER — OFFICE VISIT (OUTPATIENT)
Facility: CLINIC | Age: 74
End: 2024-02-21
Payer: MEDICARE

## 2024-02-21 DIAGNOSIS — G93.41 ACUTE METABOLIC ENCEPHALOPATHY: ICD-10-CM

## 2024-02-21 DIAGNOSIS — R53.1 GENERALIZED WEAKNESS: Primary | ICD-10-CM

## 2024-02-21 DIAGNOSIS — I63.9 CEREBROVASCULAR ACCIDENT (CVA), UNSPECIFIED MECHANISM (HCC): ICD-10-CM
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
## 2024-02-21 PROCEDURE — 99310 SBSQ NF CARE HIGH MDM 45: CPT | Performed by: INTERNAL MEDICINE

## 2024-02-21 PROCEDURE — G8484 FLU IMMUNIZE NO ADMIN: HCPCS | Performed by: INTERNAL MEDICINE

## 2024-02-21 PROCEDURE — 1123F ACP DISCUSS/DSCN MKR DOCD: CPT | Performed by: INTERNAL MEDICINE

## 2024-02-21 NOTE — PROGRESS NOTES
PLACE OF SERVICE:  [facility] [address], [place] [address]     SKILLED VISIT    2/21/2024            CC:  Discharge planning  Follow-up and monitoring progress      [place]:  73 y.o.  female with PMHx of multiple strokes on aspirin and Xarelto was admitted in the hospital for acute confusion noted by the family when she woke up in the morning.  Patient was noted to be restless and agitated.  Patient was brought to the ED and did receive antipsychotic but it did not help.  Her diagnostic workup was negative for acute stroke.  Clinical symptoms seemed initially to be attributed by possible infection and she has received antibiotics and ID consult but diagnostic workup is negative.  Her CSF exam was negative for any evidence of meningitis.  She currently improved and overall condition is stabilized in the hospital.  She is fully alert now.  She is admitted  for deconditioning and generalized weakness, she is seen as above.  She is here for follow-up.  She is gradually working with physical therapy.  Discussed her progress with physical therapy, she walks with help of walker she is mainly in the wheelchair for transfers, she has left -sided weakness which became relatively more pronounced after recent hospitalization but gradually improving.  Discussed progress with RN, no obvious fall except few days ago, patient claims this was not a fall either..  She is without aphasia.  She stays stable and continues to be working with physical therapy, Denies chest pain shortness breath dizziness headache.  Slept good last night.  Positive plan for discharge by Friday.  Discussed progress in detail with OT PT and case management.     PMH:   CVA, left hemiparesis, gait problem, hyperlipidemia, sleep disorder, diabetes type 2 not on treatment, seasonal allergies    ROS:   Pertinent positive and negative ROS is covered under the history of present illness.  Rest of the 14 system, all review of system is

## 2024-02-23 ENCOUNTER — OFFICE VISIT (OUTPATIENT)
Facility: CLINIC | Age: 74
End: 2024-02-23

## 2024-02-23 DIAGNOSIS — R53.1 GENERALIZED WEAKNESS: Primary | ICD-10-CM

## 2024-02-23 DIAGNOSIS — G93.41 ACUTE METABOLIC ENCEPHALOPATHY: ICD-10-CM
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
## 2024-02-23 DIAGNOSIS — I63.9 CEREBROVASCULAR ACCIDENT (CVA), UNSPECIFIED MECHANISM (HCC): ICD-10-CM

## 2024-02-24 NOTE — PROGRESS NOTES
Place of Service:  [facility] [address], [place] [address]                                                                      Discharge Summary       Admit Dx:    Gen.weakness   Acute metabolic encephalopathy   Pronounced left residual weakness     Disposition: Home    Presentation:    73 y.o.  female with PMHx of multiple strokes on aspirin and Xarelto was admitted in the hospital for acute confusion noted by the family when she woke up in the morning.  Patient was noted to be restless and agitated.  Patient was brought to the ED and did receive antipsychotic but it did not help.  Her diagnostic workup was negative for acute stroke.  Clinical symptoms seemed initially to be attributed by possible infection and she has received antibiotics and ID consult but diagnostic workup is negative.  Her CSF exam was negative for any evidence of meningitis.  She currently improved and overall condition is stabilized in the hospital.  She is fully alert now.  She is admitted  for deconditioning and generalized weakness,She gradually worked with physical therapy.  Discussed her progress with physical therapy, she walks with help of walker she is mainly in the wheelchair for transfers, she has left -sided weakness which became relatively more pronounced after recent hospitalization but gradually improving.  Discussed progress with RN, no obvious fall except few days ago, patient claims this was not a fall either..  She is without aphasia.  She stays stable and continues to be working with physical therapy, Denies chest pain shortness breath dizziness headache.  Slept good last night.stays in good spirit        PMH:   CVA, right hemiparesis, gait problem, hyperlipidemia, sleep disorder, diabetes type 2 not on treatment, seasonal allergies    ROS:   Pertinent positive and negative ROS is covered under the history of present illness.  Rest of the 14 system, all review of system is unremarkable for

## 2025-04-21 ENCOUNTER — TRANSCRIBE ORDERS (OUTPATIENT)
Facility: HOSPITAL | Age: 75
End: 2025-04-21

## 2025-04-21 DIAGNOSIS — R68.89 FORGETFULNESS: Primary | ICD-10-CM

## 2025-06-16 ENCOUNTER — TRANSCRIBE ORDERS (OUTPATIENT)
Facility: HOSPITAL | Age: 75
End: 2025-06-16

## 2025-06-16 DIAGNOSIS — F03.90 DEMENTIA, UNSPECIFIED DEMENTIA SEVERITY, UNSPECIFIED DEMENTIA TYPE, UNSPECIFIED WHETHER BEHAVIORAL, PSYCHOTIC, OR MOOD DISTURBANCE OR ANXIETY (HCC): Primary | ICD-10-CM

## 2025-08-11 ENCOUNTER — HOSPITAL ENCOUNTER (EMERGENCY)
Facility: HOSPITAL | Age: 75
Discharge: HOME | End: 2025-08-11
Attending: STUDENT IN AN ORGANIZED HEALTH CARE EDUCATION/TRAINING PROGRAM
Payer: MEDICARE

## 2025-08-11 VITALS
SYSTOLIC BLOOD PRESSURE: 146 MMHG | WEIGHT: 150 LBS | HEART RATE: 90 BPM | OXYGEN SATURATION: 95 % | HEIGHT: 61 IN | DIASTOLIC BLOOD PRESSURE: 82 MMHG | BODY MASS INDEX: 28.32 KG/M2 | RESPIRATION RATE: 18 BRPM | TEMPERATURE: 98 F

## 2025-08-11 DIAGNOSIS — R04.0 EPISTAXIS: Primary | ICD-10-CM

## 2025-08-11 PROCEDURE — 30901 CONTROL OF NOSEBLEED: CPT

## 2025-08-11 PROCEDURE — 6360000002 HC RX W HCPCS: Performed by: STUDENT IN AN ORGANIZED HEALTH CARE EDUCATION/TRAINING PROGRAM

## 2025-08-11 PROCEDURE — 6370000000 HC RX 637 (ALT 250 FOR IP): Performed by: STUDENT IN AN ORGANIZED HEALTH CARE EDUCATION/TRAINING PROGRAM

## 2025-08-11 PROCEDURE — 99283 EMERGENCY DEPT VISIT LOW MDM: CPT

## 2025-08-11 RX ORDER — LIDOCAINE HYDROCHLORIDE AND EPINEPHRINE 10; 10 MG/ML; UG/ML
10 INJECTION, SOLUTION INFILTRATION; PERINEURAL ONCE
Status: COMPLETED | OUTPATIENT
Start: 2025-08-11 | End: 2025-08-11

## 2025-08-11 RX ORDER — CEPHALEXIN 500 MG/1
500 CAPSULE ORAL 3 TIMES DAILY
Qty: 9 CAPSULE | Refills: 0 | Status: SHIPPED | OUTPATIENT
Start: 2025-08-11 | End: 2025-08-14

## 2025-08-11 RX ORDER — MEMANTINE HYDROCHLORIDE 5 MG/1
TABLET ORAL
COMMUNITY
Start: 2025-06-12

## 2025-08-11 RX ORDER — LIDOCAINE AND PRILOCAINE 25; 25 MG/G; MG/G
CREAM TOPICAL
COMMUNITY
Start: 2025-06-08

## 2025-08-11 RX ORDER — OXYBUTYNIN CHLORIDE 5 MG/1
TABLET, EXTENDED RELEASE ORAL
COMMUNITY
Start: 2025-07-17

## 2025-08-11 RX ORDER — HALOPERIDOL 2 MG/1
TABLET ORAL
COMMUNITY
Start: 2025-07-24

## 2025-08-11 RX ORDER — OXYMETAZOLINE HYDROCHLORIDE 0.05 G/100ML
2 SPRAY NASAL
Status: COMPLETED | OUTPATIENT
Start: 2025-08-11 | End: 2025-08-11

## 2025-08-11 RX ORDER — LORAZEPAM 0.5 MG/1
0.5 TABLET ORAL
Status: COMPLETED | OUTPATIENT
Start: 2025-08-11 | End: 2025-08-11

## 2025-08-11 RX ADMIN — LORAZEPAM 0.5 MG: 0.5 TABLET ORAL at 13:53

## 2025-08-11 RX ADMIN — OXYMETAZOLINE HYDROCHLORIDE 2 SPRAY: 0.5 SPRAY NASAL at 13:55

## 2025-08-11 RX ADMIN — LIDOCAINE HYDROCHLORIDE,EPINEPHRINE BITARTRATE 10 ML: 10; .01 INJECTION, SOLUTION INFILTRATION; PERINEURAL at 13:57

## 2025-08-11 ASSESSMENT — PAIN - FUNCTIONAL ASSESSMENT: PAIN_FUNCTIONAL_ASSESSMENT: 0-10

## 2025-08-11 ASSESSMENT — LIFESTYLE VARIABLES
HOW MANY STANDARD DRINKS CONTAINING ALCOHOL DO YOU HAVE ON A TYPICAL DAY: PATIENT DOES NOT DRINK
HOW OFTEN DO YOU HAVE A DRINK CONTAINING ALCOHOL: NEVER

## 2025-08-11 ASSESSMENT — PAIN SCALES - GENERAL: PAINLEVEL_OUTOF10: 0
